# Patient Record
Sex: FEMALE | Race: WHITE | Employment: UNEMPLOYED | URBAN - METROPOLITAN AREA
[De-identification: names, ages, dates, MRNs, and addresses within clinical notes are randomized per-mention and may not be internally consistent; named-entity substitution may affect disease eponyms.]

---

## 2019-08-12 ENCOUNTER — HOSPITAL ENCOUNTER (EMERGENCY)
Facility: HOSPITAL | Age: 54
Discharge: HOME | End: 2019-08-13
Attending: EMERGENCY MEDICINE
Payer: MEDICAID

## 2019-08-12 DIAGNOSIS — J45.901 ASTHMA EXACERBATION, MILD: Primary | ICD-10-CM

## 2019-08-12 DIAGNOSIS — K21.9 GASTROESOPHAGEAL REFLUX DISEASE, ESOPHAGITIS PRESENCE NOT SPECIFIED: ICD-10-CM

## 2019-08-12 PROCEDURE — 96375 TX/PRO/DX INJ NEW DRUG ADDON: CPT

## 2019-08-12 PROCEDURE — 96374 THER/PROPH/DIAG INJ IV PUSH: CPT

## 2019-08-12 PROCEDURE — 99284 EMERGENCY DEPT VISIT MOD MDM: CPT

## 2019-08-13 ENCOUNTER — APPOINTMENT (OUTPATIENT)
Dept: GENERAL RADIOLOGY | Facility: HOSPITAL | Age: 54
End: 2019-08-13
Attending: EMERGENCY MEDICINE
Payer: MEDICAID

## 2019-08-13 VITALS
WEIGHT: 216 LBS | HEART RATE: 67 BPM | RESPIRATION RATE: 18 BRPM | TEMPERATURE: 98 F | SYSTOLIC BLOOD PRESSURE: 130 MMHG | DIASTOLIC BLOOD PRESSURE: 56 MMHG | HEIGHT: 64 IN | BODY MASS INDEX: 36.88 KG/M2 | OXYGEN SATURATION: 93 %

## 2019-08-13 LAB
ANION GAP SERPL CALC-SCNC: 7 MMOL/L (ref 0–18)
BASOPHILS # BLD AUTO: 0.05 X10(3) UL (ref 0–0.2)
BASOPHILS NFR BLD AUTO: 0.5 %
BUN BLD-MCNC: 10 MG/DL (ref 7–18)
BUN/CREAT SERPL: 13.3 (ref 10–20)
CALCIUM BLD-MCNC: 10.6 MG/DL (ref 8.5–10.1)
CHLORIDE SERPL-SCNC: 104 MMOL/L (ref 98–112)
CO2 SERPL-SCNC: 28 MMOL/L (ref 21–32)
CREAT BLD-MCNC: 0.75 MG/DL (ref 0.55–1.02)
DEPRECATED RDW RBC AUTO: 44.3 FL (ref 35.1–46.3)
EOSINOPHIL # BLD AUTO: 0.41 X10(3) UL (ref 0–0.7)
EOSINOPHIL NFR BLD AUTO: 4 %
ERYTHROCYTE [DISTWIDTH] IN BLOOD BY AUTOMATED COUNT: 13.1 % (ref 11–15)
GLUCOSE BLD-MCNC: 118 MG/DL (ref 70–99)
HCT VFR BLD AUTO: 46.8 % (ref 35–48)
HGB BLD-MCNC: 16.1 G/DL (ref 12–16)
IMM GRANULOCYTES # BLD AUTO: 0.03 X10(3) UL (ref 0–1)
IMM GRANULOCYTES NFR BLD: 0.3 %
LYMPHOCYTES # BLD AUTO: 2.27 X10(3) UL (ref 1–4)
LYMPHOCYTES NFR BLD AUTO: 22.1 %
MCH RBC QN AUTO: 31.6 PG (ref 26–34)
MCHC RBC AUTO-ENTMCNC: 34.4 G/DL (ref 31–37)
MCV RBC AUTO: 91.9 FL (ref 80–100)
MONOCYTES # BLD AUTO: 0.52 X10(3) UL (ref 0.1–1)
MONOCYTES NFR BLD AUTO: 5.1 %
NEUTROPHILS # BLD AUTO: 7.01 X10 (3) UL (ref 1.5–7.7)
NEUTROPHILS # BLD AUTO: 7.01 X10(3) UL (ref 1.5–7.7)
NEUTROPHILS NFR BLD AUTO: 68 %
NT-PROBNP SERPL-MCNC: 10 PG/ML (ref ?–125)
OSMOLALITY SERPL CALC.SUM OF ELEC: 288 MOSM/KG (ref 275–295)
PLATELET # BLD AUTO: 267 10(3)UL (ref 150–450)
RBC # BLD AUTO: 5.09 X10(6)UL (ref 3.8–5.3)
SODIUM SERPL-SCNC: 139 MMOL/L (ref 136–145)
TROPONIN I SERPL-MCNC: <0.045 NG/ML (ref ?–0.04)
WBC # BLD AUTO: 10.3 X10(3) UL (ref 4–11)

## 2019-08-13 PROCEDURE — 83880 ASSAY OF NATRIURETIC PEPTIDE: CPT | Performed by: EMERGENCY MEDICINE

## 2019-08-13 PROCEDURE — 94640 AIRWAY INHALATION TREATMENT: CPT

## 2019-08-13 PROCEDURE — 85025 COMPLETE CBC W/AUTO DIFF WBC: CPT | Performed by: EMERGENCY MEDICINE

## 2019-08-13 PROCEDURE — C9113 INJ PANTOPRAZOLE SODIUM, VIA: HCPCS | Performed by: EMERGENCY MEDICINE

## 2019-08-13 PROCEDURE — 93005 ELECTROCARDIOGRAM TRACING: CPT

## 2019-08-13 PROCEDURE — 93010 ELECTROCARDIOGRAM REPORT: CPT | Performed by: EMERGENCY MEDICINE

## 2019-08-13 PROCEDURE — 84484 ASSAY OF TROPONIN QUANT: CPT | Performed by: EMERGENCY MEDICINE

## 2019-08-13 PROCEDURE — 71046 X-RAY EXAM CHEST 2 VIEWS: CPT | Performed by: EMERGENCY MEDICINE

## 2019-08-13 PROCEDURE — 80048 BASIC METABOLIC PNL TOTAL CA: CPT | Performed by: EMERGENCY MEDICINE

## 2019-08-13 RX ORDER — IPRATROPIUM BROMIDE AND ALBUTEROL SULFATE 2.5; .5 MG/3ML; MG/3ML
3 SOLUTION RESPIRATORY (INHALATION) ONCE
Status: COMPLETED | OUTPATIENT
Start: 2019-08-13 | End: 2019-08-13

## 2019-08-13 RX ORDER — METHYLPREDNISOLONE SODIUM SUCCINATE 125 MG/2ML
125 INJECTION, POWDER, LYOPHILIZED, FOR SOLUTION INTRAMUSCULAR; INTRAVENOUS ONCE
Status: COMPLETED | OUTPATIENT
Start: 2019-08-13 | End: 2019-08-13

## 2019-08-13 RX ORDER — PANTOPRAZOLE SODIUM 40 MG/1
40 TABLET, DELAYED RELEASE ORAL DAILY
Qty: 14 TABLET | Refills: 0 | Status: SHIPPED | OUTPATIENT
Start: 2019-08-13 | End: 2019-08-27

## 2019-08-13 RX ORDER — METOCLOPRAMIDE HYDROCHLORIDE 5 MG/ML
5 INJECTION INTRAMUSCULAR; INTRAVENOUS ONCE
Status: COMPLETED | OUTPATIENT
Start: 2019-08-13 | End: 2019-08-13

## 2019-08-13 RX ORDER — METHYLPREDNISOLONE 4 MG/1
TABLET ORAL
Qty: 1 PACKAGE | Refills: 0 | Status: SHIPPED | OUTPATIENT
Start: 2019-08-13

## 2019-08-13 NOTE — ED PROVIDER NOTES
Patient Seen in: [facility] AND 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 There is no tenderness. There is no guarding. Musculoskeletal: Normal range of motion. No edema or tenderness. Neurological: Alert and oriented to person, place, and time. Skin: Skin is warm and dry. Psychiatric: Normal mood and affect.   Beha electronically signed and verified by the Radiologist whose name is printed above. DD:  08/13/2019/DT:  08/13/2019         [facility]   Patient feeling improved here. Work-up unremarkable. Tolerating p.o. Instructed on close follow-up and return.   Extremely

## 2025-03-19 ENCOUNTER — APPOINTMENT (OUTPATIENT)
Dept: GENERAL RADIOLOGY | Facility: HOSPITAL | Age: 60
End: 2025-03-19
Attending: STUDENT IN AN ORGANIZED HEALTH CARE EDUCATION/TRAINING PROGRAM
Payer: MEDICAID

## 2025-03-19 ENCOUNTER — HOSPITAL ENCOUNTER (EMERGENCY)
Facility: HOSPITAL | Age: 60
Discharge: HOME | End: 2025-03-19
Attending: STUDENT IN AN ORGANIZED HEALTH CARE EDUCATION/TRAINING PROGRAM
Payer: MEDICAID

## 2025-03-19 VITALS
HEART RATE: 60 BPM | BODY MASS INDEX: 28.67 KG/M2 | RESPIRATION RATE: 18 BRPM | OXYGEN SATURATION: 98 % | TEMPERATURE: 98 F | WEIGHT: 170 LBS | HEIGHT: 64.5 IN | SYSTOLIC BLOOD PRESSURE: 149 MMHG | DIASTOLIC BLOOD PRESSURE: 56 MMHG

## 2025-03-19 DIAGNOSIS — M79.671 RIGHT FOOT PAIN: Primary | ICD-10-CM

## 2025-03-19 DIAGNOSIS — I10 HYPERTENSION, UNSPECIFIED TYPE: ICD-10-CM

## 2025-03-19 LAB
ANION GAP SERPL CALC-SCNC: 6 MMOL/L (ref 0–18)
BASOPHILS # BLD AUTO: 0.04 X10(3) UL (ref 0–0.2)
BASOPHILS NFR BLD AUTO: 0.5 %
BUN BLD-MCNC: <5 MG/DL (ref 9–23)
CALCIUM BLD-MCNC: 9.3 MG/DL (ref 8.7–10.4)
CHLORIDE SERPL-SCNC: 106 MMOL/L (ref 98–112)
CO2 SERPL-SCNC: 27 MMOL/L (ref 21–32)
CREAT BLD-MCNC: 0.64 MG/DL
DEPRECATED RDW RBC AUTO: 40.5 FL (ref 35.1–46.3)
EGFRCR SERPLBLD CKD-EPI 2021: 100 ML/MIN/1.73M2 (ref 60–?)
EOSINOPHIL # BLD AUTO: 0.34 X10(3) UL (ref 0–0.7)
EOSINOPHIL NFR BLD AUTO: 4.4 %
ERYTHROCYTE [DISTWIDTH] IN BLOOD BY AUTOMATED COUNT: 12.2 % (ref 11–15)
GLUCOSE BLD-MCNC: 78 MG/DL (ref 70–99)
HCT VFR BLD AUTO: 42.2 %
HGB BLD-MCNC: 14.6 G/DL
IMM GRANULOCYTES # BLD AUTO: 0.02 X10(3) UL (ref 0–1)
IMM GRANULOCYTES NFR BLD: 0.3 %
LYMPHOCYTES # BLD AUTO: 1.98 X10(3) UL (ref 1–4)
LYMPHOCYTES NFR BLD AUTO: 25.6 %
MCH RBC QN AUTO: 31.4 PG (ref 26–34)
MCHC RBC AUTO-ENTMCNC: 34.6 G/DL (ref 31–37)
MCV RBC AUTO: 90.8 FL
MONOCYTES # BLD AUTO: 0.45 X10(3) UL (ref 0.1–1)
MONOCYTES NFR BLD AUTO: 5.8 %
NEUTROPHILS # BLD AUTO: 4.9 X10 (3) UL (ref 1.5–7.7)
NEUTROPHILS # BLD AUTO: 4.9 X10(3) UL (ref 1.5–7.7)
NEUTROPHILS NFR BLD AUTO: 63.4 %
PLATELET # BLD AUTO: 228 10(3)UL (ref 150–450)
POTASSIUM SERPL-SCNC: 4.2 MMOL/L (ref 3.5–5.1)
RBC # BLD AUTO: 4.65 X10(6)UL
SODIUM SERPL-SCNC: 139 MMOL/L (ref 136–145)
WBC # BLD AUTO: 7.7 X10(3) UL (ref 4–11)

## 2025-03-19 PROCEDURE — 85025 COMPLETE CBC W/AUTO DIFF WBC: CPT

## 2025-03-19 PROCEDURE — 80048 BASIC METABOLIC PNL TOTAL CA: CPT | Performed by: STUDENT IN AN ORGANIZED HEALTH CARE EDUCATION/TRAINING PROGRAM

## 2025-03-19 PROCEDURE — 80048 BASIC METABOLIC PNL TOTAL CA: CPT

## 2025-03-19 PROCEDURE — 99284 EMERGENCY DEPT VISIT MOD MDM: CPT

## 2025-03-19 PROCEDURE — 85025 COMPLETE CBC W/AUTO DIFF WBC: CPT | Performed by: STUDENT IN AN ORGANIZED HEALTH CARE EDUCATION/TRAINING PROGRAM

## 2025-03-19 PROCEDURE — 36415 COLL VENOUS BLD VENIPUNCTURE: CPT

## 2025-03-19 PROCEDURE — 73630 X-RAY EXAM OF FOOT: CPT | Performed by: STUDENT IN AN ORGANIZED HEALTH CARE EDUCATION/TRAINING PROGRAM

## 2025-03-19 RX ORDER — HYDROCODONE BITARTRATE AND ACETAMINOPHEN 5; 325 MG/1; MG/1
1 TABLET ORAL
COMMUNITY
Start: 2025-01-23

## 2025-03-19 RX ORDER — AMLODIPINE BESYLATE 5 MG/1
5 TABLET ORAL DAILY
Qty: 30 TABLET | Refills: 0 | Status: SHIPPED | OUTPATIENT
Start: 2025-03-19

## 2025-03-19 RX ORDER — LANSOPRAZOLE 30 MG/1
1 TABLET, ORALLY DISINTEGRATING, DELAYED RELEASE ORAL
COMMUNITY
Start: 2023-09-18

## 2025-03-19 RX ORDER — BUTALBITAL, ACETAMINOPHEN AND CAFFEINE 50; 325; 40 MG/1; MG/1; MG/1
1 TABLET ORAL
COMMUNITY
Start: 2024-08-20

## 2025-03-19 RX ORDER — DIAZEPAM 10 MG/1
10 TABLET ORAL AS NEEDED
COMMUNITY

## 2025-03-19 NOTE — DISCHARGE INSTRUCTIONS
Take Tylenol as needed for pain in your foot  Ice and elevate the foot  Take the medication as prescribed for elevated blood pressure, check your blood pressure daily and keep a log of this to provide your primary care doctor  Return to the ER for any new or worsening symptoms

## 2025-03-19 NOTE — ED INITIAL ASSESSMENT (HPI)
Pt presents to the ER with c/o intermittent atraumatic right foot swelling x 6 months.     Pt states she no longer has a PCP. Also is not prescribed B/P medications 198/71 in triage

## 2025-03-19 NOTE — ED PROVIDER NOTES
Patient Seen in: [facility] Emergency Department      History     Chief Complaint   Patient presents with    Swelling Edema     Stated Complaint: r foot swelling    Subjective:   HPI      59-year-old female with history of asthma seizure disorder, present for evaluation of right foot swelling.  She describes a few days atraumatic right foot pain and swelling.  Mostly on the dorsum of the right foot just distal to the ankle fold.  Cannot recall any trauma.  No fevers or chills.     Objective:     Past Medical History:    Asthma (HCC)    Seizure disorder (HCC)              History reviewed. No pertinent surgical history.             Social History     Socioeconomic History    Marital status:    Tobacco Use    Smoking status: Heavy Smoker     Current packs/day: 1.00     Types: Cigarettes    Smokeless tobacco: Never     Social Drivers of Health     Food Insecurity: No Food Insecurity (4/24/2024)    Received from [facility]    Hunger Vital Sign     Worried About Running Out of Food in the Last Year: Never true     Ran Out of Food in the Last Year: Never true   Transportation Needs: Unmet Transportation Needs (4/24/2024)    Received from [facility]    PRAPARE - Transportation     Lack of Transportation (Medical): Yes     Lack of Transportation (Non-Medical): Yes   Housing Stability: Low Risk  (4/24/2024)    Received from [facility]    Housing Stability Vital Sign     Unable to Pay for Housing in the Last Year: No     Number of Places Lived in the Last Year: 1     Unstable Housing in the Last Year: No                  Physical Exam     ED Triage Vitals [03/19/25 1030]   BP (!) 198/71   Pulse 83   Resp 18   Temp 97.8 °F (36.6 °C)   Temp src Oral   SpO2 95 %   O2 Device None (Room air)       Current Vitals:   Vital Signs  BP: 149/56  Pulse: 60  Resp: 18  Temp: 97.8 °F (36.6 °C)  Temp src: Oral    Oxygen Therapy  SpO2: 98 %  O2 Device: None  (Room air)        Physical Exam  Constitutional: awake, alert, no sig distress  HENT: mmm, no lesions,  Neck: normal range of motion, no tenderness, supple.  Eyes: PERRL, EOMI, conjunctiva normal, no discharge. Sclera anicteric.  Cardiovascular: rr no murmur  Respiratory: Normal breath sounds, no respiratory distress, no wheezing, no chest tenderness.  GI: Bowel sounds normal, Soft, no tenderness, no masses, no pulsatile masses.  : No CVA tenderness.  Skin: Warm, dry, no erythema, no rash.  Musculoskeletal: Intact distal pulses, trace pedal edema bilaterally, with attention to right foot the point of maximal tenderness is in the midline of the dorsum of the right foot just distal to ankle fold.  There is no associated skin changes.  There is no crepitus or deformity.  There is no pain about the ankle.  Achilles is intact.  Sensation to light touch.  Strength appears limited secondary to pain.  Neurologic: Alert & oriented x 3, normal motor function, normal sensory function, no focal deficits noted.  Psych: Calm, cooperative, nl affect        ED Course     Labs Reviewed   BASIC METABOLIC PANEL (8) - Abnormal; Notable for the following components:       Result Value    BUN <5 (*)     All other components within normal limits   CBC WITH DIFFERENTIAL WITH PLATELET   RAINBOW DRAW LAVENDER   RAINBOW DRAW LIGHT GREEN   RAINBOW DRAW BLUE       ED Course as of 03/19/25 1413  ------------------------------------------------------------  Time: 03/19 1328  Comment: Reviewed x-ray of right foot independently which is remarkable for degenerative changes but no acute fracture.              MDM      59F hx as above who presents with atraumatic right foot pain/swelling  On arrival - markedly hypertensive - likely 2/2 pain and HTN  Ddx: occult fracture, OA, muscular strain  -consider screening labs to eval renal function in light of marked HTN on presentation      Labs reviewed no acute findings.  Remains hypertensive in the ER  will start amlodipine.  Return precautions and follow-up instructions were discussed with patient who voiced understanding and agreement the plan.  All questions were answered to patient satisfaction.  Medical Decision Making      Disposition and Plan     Clinical Impression:  1. Right foot pain    2. Hypertension, unspecified type         Disposition:  Discharge  3/19/2025  1:30 pm    Follow-up:  [facility] Emergency Department  [address]  [facility] [address]  [phone]  Follow up  As needed, If symptoms worsen    Mateo Howard MD  [address] [place] [id number]  [phone]    Call            Medications Prescribed:  Discharge Medication List as of 3/19/2025  1:43 PM        START taking these medications    Details   amLODIPine 5 MG Oral Tab Take 1 tablet (5 mg total) by mouth daily., Normal, Disp-30 tablet, R-0                 Supplementary Documentation:

## 2025-04-02 ENCOUNTER — OFFICE VISIT (OUTPATIENT)
Dept: INTERNAL MEDICINE CLINIC | Facility: CLINIC | Age: 60
End: 2025-04-02

## 2025-04-02 VITALS
SYSTOLIC BLOOD PRESSURE: 134 MMHG | DIASTOLIC BLOOD PRESSURE: 73 MMHG | BODY MASS INDEX: 29.53 KG/M2 | HEART RATE: 78 BPM | WEIGHT: 173 LBS | HEIGHT: 64 IN

## 2025-04-02 DIAGNOSIS — I10 ESSENTIAL HYPERTENSION: Chronic | ICD-10-CM

## 2025-04-02 DIAGNOSIS — N83.202 CYSTS OF BOTH OVARIES: ICD-10-CM

## 2025-04-02 DIAGNOSIS — K80.10 CALCULUS OF GALLBLADDER WITH CHRONIC CHOLECYSTITIS WITHOUT OBSTRUCTION: ICD-10-CM

## 2025-04-02 DIAGNOSIS — R59.9 LYMPH NODES ENLARGED: ICD-10-CM

## 2025-04-02 DIAGNOSIS — D13.0 SQUAMOUS CELL PAPILLOMA OF ESOPHAGUS: ICD-10-CM

## 2025-04-02 DIAGNOSIS — M25.50 POLYARTHRALGIA: ICD-10-CM

## 2025-04-02 DIAGNOSIS — N39.46 MIXED STRESS AND URGE URINARY INCONTINENCE: Chronic | ICD-10-CM

## 2025-04-02 DIAGNOSIS — Z12.11 ENCOUNTER FOR COLORECTAL CANCER SCREENING: Primary | ICD-10-CM

## 2025-04-02 DIAGNOSIS — R91.1 PULMONARY NODULE: Chronic | ICD-10-CM

## 2025-04-02 DIAGNOSIS — K21.9 GASTROESOPHAGEAL REFLUX DISEASE WITHOUT ESOPHAGITIS: ICD-10-CM

## 2025-04-02 DIAGNOSIS — M50.90 CERVICAL DISC DISORDER: ICD-10-CM

## 2025-04-02 DIAGNOSIS — Z87.19 HISTORY OF PANCREATITIS: ICD-10-CM

## 2025-04-02 DIAGNOSIS — Z12.12 ENCOUNTER FOR COLORECTAL CANCER SCREENING: Primary | ICD-10-CM

## 2025-04-02 DIAGNOSIS — N83.201 CYSTS OF BOTH OVARIES: ICD-10-CM

## 2025-04-02 PROCEDURE — 99205 OFFICE O/P NEW HI 60 MIN: CPT | Performed by: STUDENT IN AN ORGANIZED HEALTH CARE EDUCATION/TRAINING PROGRAM

## 2025-04-02 RX ORDER — MECOBALAMIN 5000 MCG
15 TABLET,DISINTEGRATING ORAL DAILY
COMMUNITY
End: 2025-04-02

## 2025-04-02 RX ORDER — DIPHENHYDRAMINE HCL 25 MG
25 CAPSULE ORAL EVERY 6 HOURS PRN
COMMUNITY

## 2025-04-02 RX ORDER — DIAZEPAM 5 MG/1
5 TABLET ORAL NIGHTLY PRN
COMMUNITY
Start: 2025-01-18 | End: 2025-04-02

## 2025-04-02 RX ORDER — AMLODIPINE BESYLATE 10 MG/1
10 TABLET ORAL DAILY
Qty: 90 TABLET | Refills: 1 | Status: SHIPPED | OUTPATIENT
Start: 2025-04-02

## 2025-04-02 RX ORDER — MECOBALAMIN 5000 MCG
15 TABLET,DISINTEGRATING ORAL DAILY
Qty: 90 CAPSULE | Refills: 3 | Status: SHIPPED | OUTPATIENT
Start: 2025-04-02

## 2025-04-02 NOTE — PROGRESS NOTES
OFFICE NOTE       The following individual(s) verbally consented to be recorded using ambient AI listening technology and understand that they can each withdraw their consent to this listening technology at any point by asking the clinician to turn off or pause the recording:    Patient name: Ariana Dumont  Additional names:            Patient ID: Ariana Dumont is a 59 year old female.  Today's Date: 04/02/25  Chief Complaint: Establish Care and ER F/U    History of Present Illness  Ariana Dumont is a 59 year old female who presents to establish [facility] after a recent ER visit for hypertension and acute pancreatitis.    She was hospitalized on January 21st and 22nd at [facility] for acute pancreatitis. She also visited the ER on March 19th for right foot swelling. She has a history of acute pancreatitis and is due for colorectal cancer screening.    She has a history of gallbladder issues, with a previous recommendation for removal, which she has not pursued. She experiences pain in the right side below the ovaries. She has a history of cervical degenerative disc disease, neck pain, hip and leg pain, and osteoarthritis in her hands and feet.    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 She experiences pain and mucus in her urine occasionally, with reddish urine at times. She also reports a history of migraine disorders.    She has allergies to sulfa and contrast dye, which previously caused her heart to stop. She reports a history of a right pulmonary lobe nodule and a history of fibromyalgia. She experiences leg swelling, which she attributes to either bladder issues or arthritis, and has difficulty wearing shoes due to swelling.    She reports a palpable lymph node on the right side of her neck that causes pain radiating to her ear and sinuses, particularly at night. She also mentions a history  of large lymph nodes in her lower abdomen.    She is on social security and disability, caring for a partner with lung cancer undergoing radiation, and has two large dogs at home. She reports urinary incontinence and is seeking assistance from public aid.    Her current medications include amlodipine 5 mg for blood pressure, lansoprazole for reflux, a women's one-a-day B complex with vitamin C, Benadryl as needed for allergies, vitamin D 1000 units daily, oil of oregano for sinus issues, and an off-brand Tylenol for arthritis pain. She reports that lansoprazole 30 mg is too strong, so she takes 15 mg in the morning.       Vitals:    04/02/25 1314 04/02/25 1332   BP: (!) 161/70 134/73   Pulse: 80 78   Weight: 173 lb (78.5 kg)    Height: 5' 4\" (1.626 m)      body mass index is 29.7 kg/m².  BP Readings from Last 3 Encounters:   04/02/25 134/73   03/19/25 149/56   08/13/19 130/56     The ASCVD Risk score (Analilia DK, et al., 2019) failed to calculate for the following reasons:    Cannot find a previous HDL lab    Cannot find a previous total cholesterol lab  Results  RADIOLOGY  MRCP: No definite stone, edematous pancreatic head and body, no splenomegaly, prominent retroperitoneal pancreatic lymph nodes (01/21/2025)  Pelvic ultrasound: No significant findings (11/13/2023)  Chest CT: 6 mm right lower lobe pulmonary nodule (06/26/2024)    DIAGNOSTIC  Colonoscopy: Signs of reflux, squamous cell papillary esophagus (05/30/2023)       Medications reviewed:  Current Outpatient Medications   Medication Sig Dispense Refill    Multiple Vitamins-Minerals (ONE-A-DAY WOMENS 50 PLUS OR) Take by mouth.      B Complex-C-Folic Acid Oral Tab Take by mouth.      diphenhydrAMINE 25 MG Oral Cap Take 1 capsule (25 mg total) by mouth every 6 (six) hours as needed for Itching.      Lansoprazole 15 MG Oral Capsule Delayed Release Take 1 capsule (15 mg total) by mouth daily. 90 capsule 3    amLODIPine 10 MG Oral Tab Take 1 tablet (10 mg total) by  mouth daily. 90 tablet 1         Assessment & Plan    1. Encounter for colorectal cancer screening (Primary)  -     Cancel: Gastro Referral - In Network  -     Gastro Referral - In Network  2. History of pancreatitis  -     Cancel: Gastro Referral - In Network  -     Gastro Referral - In Network  3. Gastroesophageal reflux disease without esophagitis  -     Lansoprazole; Take 1 capsule (15 mg total) by mouth daily.  Dispense: 90 capsule; Refill: 3  -     Gastro Referral - In Network  4. Calculus of gallbladder with chronic cholecystitis without obstruction  -     Gastro Referral - In Network  5. Essential hypertension  -     Comp Metabolic Panel (14); Future; Expected date: 04/02/2025  -     amLODIPine Besylate; Take 1 tablet (10 mg total) by mouth daily.  Dispense: 90 tablet; Refill: 1  6. Squamous cell papilloma of esophagus  -     Gastro Referral - In Network  7. Cysts of both ovaries  -     OBG Referral - [place] ([place])  8. Cervical disc disorder  -     Physiatry Referral - In Network  -     CBC With Differential With Platelet; Future; Expected date: 04/02/2025  -     Comp Metabolic Panel (14); Future; Expected date: 04/02/2025  9. Polyarthralgia  -     Physiatry Referral - In Network  -     Rheumatoid Arthritis Factor; Future; Expected date: 04/02/2025  -     C-Reactive Protein; Future; Expected date: 04/02/2025  -     Sed RateRainer (Automated); Future; Expected date: 04/02/2025  -     Connective Tissue Disease (ZAIN) Screen, Reflex Specific Antibody; Future; Expected date: 04/02/2025  -     CBC With Differential With Platelet; Future; Expected date: 04/02/2025  -     Comp Metabolic Panel (14); Future; Expected date: 04/02/2025  -     Rheumatology Referral  10. Lymph nodes enlarged  -     CT SOFT TISSUE OF NECK (CPT=70490); Future; Expected date: 04/02/2025  -     CT CHEST+ABDOMEN+PELVIS(CPT=71250/20523); Future; Expected date: 04/02/2025  11. Mixed stress and urge urinary incontinence  12. Pulmonary  nodule  -     Pulmonary Referral - In Network    Assessment & Plan  Hypertension  Persistently elevated systolic blood pressure despite initial treatment with amlodipine.  - Increase amlodipine to 10 mg daily.  - Monitor blood pressure regularly.    Acute Pancreatitis  Asymptomatic post-hospitalization for gallstone pancreatitis. No definite gallstones on MRCP.  - Refer to gastroenterologist for further evaluation and management.    Gastroesophageal Reflux Disease (GERD)  Chronic GERD managed with lansoprazole. Prefers lower dose due to side effects.  - Continue lansoprazole 15 mg as tolerated.  - Refer to gastroenterologist for further evaluation.    Gallbladder Disease  Gallbladder issues with hardening. Hesitant about surgery.  - Refer to gastroenterologist for evaluation of gallbladder and potential endoscopy.    Osteoarthritis  Pain and swelling in hands and feet affecting mobility.  - Refer to physiatrist for evaluation and management.  - Order x-rays of the spine.    Cervical Degenerative Disc Disease  Neck pain related to degenerative disc disease.  - Refer to physiatrist for evaluation and management.  - Order x-rays of the spine.    Lymphadenopathy  Palpable lymph node in neck with pain radiating to ear and sinuses. Enlarged abdominal lymph nodes on MRCP.  - Order CT of the chest, abdomen, and pelvis without contrast.    Pulmonary Nodule  6 mm right lower lobe pulmonary nodule requires monitoring.  - Refer to pulmonologist for routine monitoring.    Chronic Kidney Disease  Scarred kidney from past infections. Function well-managed.  - Monitor kidney function as part of routine care.    Rheumatoid Arthritis (suspected)  Multiple joint issues and leg swelling suggest possible autoimmune condition.  - Order preliminary blood work to assess for inflammatory or autoimmune disease.  - Refer to rheumatologist for further evaluation.    Follow-up  Follow-up necessary for chronic disease management and test result  review.  - Schedule follow-up appointment in one month for chronic disease management and physical examination.       Follow Up: As needed/if symptoms worsen or No follow-ups on file..     I spent 61 minutes obtaining pertitent medical history, reviewing pertinent imaging/labs and specialists notes, evaluating patient, discussing differential diagnosis' and various treatment options, reinforcing importance of compliance with treatment plan, and completing documentation.     Encounter Times  PreChartin minutes    Reviewing/Obtainin minutes      Medical Exam: 5 minutes    Plan: 7 minutes      Notes: 2 minutes    Counseling/Education: 7 minutes      Referring/Communicating:   minutes    Ind Interpretation:   minutes      Care Coordination:   minutes       My total time spent caring for the patient on the day of the encounter: 61 minutes.       Objective/ Results:   Physical Exam  Constitutional:       Appearance: She is well-developed.   Cardiovascular:      Rate and Rhythm: Normal rate and regular rhythm.      Heart sounds: Normal heart sounds.   Pulmonary:      Effort: Pulmonary effort is normal.      Breath sounds: Normal breath sounds.   Abdominal:      General: Bowel sounds are normal.      Palpations: Abdomen is soft.   Musculoskeletal:         General: Tenderness present.   Skin:     General: Skin is warm and dry.   Neurological:      Mental Status: She is alert and oriented to person, place, and time.      Deep Tendon Reflexes: Reflexes are normal and symmetric.        Physical Exam  VITALS: BP- 134/73  NECK: Palpable 2 cm lymph node on the right side of the neck.     Reviewed:    Patient Active Problem List    Diagnosis    Squamous cell papilloma of esophagus    Mixed stress and urge urinary incontinence    Essential hypertension    Pulmonary nodule      Allergies[1]     Social History     Socioeconomic History    Marital status:    Tobacco Use    Smoking status: Heavy Smoker     Current  packs/day: 2.00     Types: Cigarettes     Passive exposure: Current    Smokeless tobacco: Never    Tobacco comments:     2 packs a day   Vaping Use    Vaping status: Never Used   Substance and Sexual Activity    Alcohol use: Never    Drug use: Never     Social Drivers of Health     Food Insecurity: No Food Insecurity (4/24/2024)    Received from [facility]    Hunger Vital Sign     Worried About Running Out of Food in the Last Year: Never true     Ran Out of Food in the Last Year: Never true   Transportation Needs: Unmet Transportation Needs (4/24/2024)    Received from [facility]    PRAPARE - Transportation     Lack of Transportation (Medical): Yes     Lack of Transportation (Non-Medical): Yes   Housing Stability: Low Risk  (4/24/2024)    Received from [facility]    Housing Stability Vital Sign     Unable to Pay for Housing in the Last Year: No     Number of Places Lived in the Last Year: 1     Unstable Housing in the Last Year: No      Review of Systems   Constitutional: Negative.    HENT: Negative.     Eyes: Negative.    Respiratory: Negative.     Cardiovascular: Negative.    Gastrointestinal: Negative.    Genitourinary: Negative.    Musculoskeletal:  Positive for arthralgias.   Skin: Negative.    Neurological: Negative.    Psychiatric/Behavioral: Negative.         All other systems negative unless otherwise stated in ROS or HPI above.       Mateo Howard MD  Internal Medicine       Call office with any questions or seek emergency care if necessary.   Patient understands and agrees to follow directions and advice.      ----------------------------------------- PATIENT INSTRUCTIONS-----------------------------------------     There are no Patient Instructions on file for this visit.        The 21st Century Cures Act makes medical notes available to patients in the interest of transparency.  However, please be advised that this is a medical document.   It is intended as a peer to peer communication.  It is written in medical language and may contain abbreviations or verbiage that are technical and unfamiliar.  It may appear blunt or direct.  Medical documents are intended to carry relevant information, facts as evident, and the clinical opinion of the practitioner.          [1]   Allergies  Allergen Reactions    Iodine (Topical) ANAPHYLAXIS    Radiology Contrast Iodinated Dyes SWELLING    Sulfa Antibiotics UNKNOWN

## 2025-04-03 ENCOUNTER — TELEPHONE (OUTPATIENT)
Dept: INTERNAL MEDICINE CLINIC | Facility: CLINIC | Age: 60
End: 2025-04-03

## 2025-04-04 ENCOUNTER — LAB ENCOUNTER (OUTPATIENT)
Dept: LAB | Facility: HOSPITAL | Age: 60
End: 2025-04-04
Attending: STUDENT IN AN ORGANIZED HEALTH CARE EDUCATION/TRAINING PROGRAM
Payer: MEDICAID

## 2025-04-04 ENCOUNTER — OFFICE VISIT (OUTPATIENT)
Dept: PULMONOLOGY | Facility: CLINIC | Age: 60
End: 2025-04-04
Payer: MEDICAID

## 2025-04-04 VITALS
BODY MASS INDEX: 29.53 KG/M2 | WEIGHT: 173 LBS | HEART RATE: 71 BPM | OXYGEN SATURATION: 96 % | DIASTOLIC BLOOD PRESSURE: 67 MMHG | HEIGHT: 64 IN | SYSTOLIC BLOOD PRESSURE: 150 MMHG

## 2025-04-04 DIAGNOSIS — R06.09 DYSPNEA ON EXERTION: Primary | ICD-10-CM

## 2025-04-04 DIAGNOSIS — Z87.09 HISTORY OF ASTHMA: ICD-10-CM

## 2025-04-04 DIAGNOSIS — R91.1 PULMONARY NODULE: ICD-10-CM

## 2025-04-04 DIAGNOSIS — I10 ESSENTIAL HYPERTENSION: Chronic | ICD-10-CM

## 2025-04-04 DIAGNOSIS — R06.09 DYSPNEA ON EXERTION: ICD-10-CM

## 2025-04-04 DIAGNOSIS — M25.50 POLYARTHRALGIA: ICD-10-CM

## 2025-04-04 DIAGNOSIS — F17.200 TOBACCO USE DISORDER: ICD-10-CM

## 2025-04-04 DIAGNOSIS — M50.90 CERVICAL DISC DISORDER: ICD-10-CM

## 2025-04-04 LAB
ALBUMIN SERPL-MCNC: 4.8 G/DL (ref 3.2–4.8)
ALBUMIN/GLOB SERPL: 2.1 {RATIO} (ref 1–2)
ALP LIVER SERPL-CCNC: 99 U/L
ALT SERPL-CCNC: 19 U/L
ANION GAP SERPL CALC-SCNC: 7 MMOL/L (ref 0–18)
AST SERPL-CCNC: 19 U/L (ref ?–34)
BASOPHILS # BLD AUTO: 0.05 X10(3) UL (ref 0–0.2)
BASOPHILS NFR BLD AUTO: 0.5 %
BILIRUB SERPL-MCNC: 0.4 MG/DL (ref 0.2–1.1)
BUN BLD-MCNC: <5 MG/DL (ref 9–23)
CALCIUM BLD-MCNC: 9.5 MG/DL (ref 8.7–10.4)
CHLORIDE SERPL-SCNC: 108 MMOL/L (ref 98–112)
CO2 SERPL-SCNC: 26 MMOL/L (ref 21–32)
CREAT BLD-MCNC: 0.65 MG/DL
CRP SERPL-MCNC: <0.4 MG/DL (ref ?–1)
DEPRECATED RDW RBC AUTO: 40.6 FL (ref 35.1–46.3)
EGFRCR SERPLBLD CKD-EPI 2021: 100 ML/MIN/1.73M2 (ref 60–?)
EOSINOPHIL # BLD AUTO: 0.36 X10(3) UL (ref 0–0.7)
EOSINOPHIL NFR BLD AUTO: 3.6 %
ERYTHROCYTE [DISTWIDTH] IN BLOOD BY AUTOMATED COUNT: 12 % (ref 11–15)
ERYTHROCYTE [SEDIMENTATION RATE] IN BLOOD: 26 MM/HR
FASTING STATUS PATIENT QL REPORTED: YES
GLOBULIN PLAS-MCNC: 2.3 G/DL (ref 2–3.5)
GLUCOSE BLD-MCNC: 95 MG/DL (ref 70–99)
HCT VFR BLD AUTO: 43.8 %
HGB BLD-MCNC: 15.6 G/DL
IMM GRANULOCYTES # BLD AUTO: 0.04 X10(3) UL (ref 0–1)
IMM GRANULOCYTES NFR BLD: 0.4 %
LYMPHOCYTES # BLD AUTO: 2.13 X10(3) UL (ref 1–4)
LYMPHOCYTES NFR BLD AUTO: 21 %
MCH RBC QN AUTO: 32.5 PG (ref 26–34)
MCHC RBC AUTO-ENTMCNC: 35.6 G/DL (ref 31–37)
MCV RBC AUTO: 91.3 FL
MONOCYTES # BLD AUTO: 0.35 X10(3) UL (ref 0.1–1)
MONOCYTES NFR BLD AUTO: 3.5 %
NEUTROPHILS # BLD AUTO: 7.2 X10 (3) UL (ref 1.5–7.7)
NEUTROPHILS # BLD AUTO: 7.2 X10(3) UL (ref 1.5–7.7)
NEUTROPHILS NFR BLD AUTO: 71 %
NT-PROBNP SERPL-MCNC: <35 PG/ML (ref ?–125)
PLATELET # BLD AUTO: 229 10(3)UL (ref 150–450)
POTASSIUM SERPL-SCNC: 4.2 MMOL/L (ref 3.5–5.1)
PROT SERPL-MCNC: 7.1 G/DL (ref 5.7–8.2)
RBC # BLD AUTO: 4.8 X10(6)UL
RHEUMATOID FACT SERPL-ACNC: 8 IU/ML (ref ?–14)
SODIUM SERPL-SCNC: 141 MMOL/L (ref 136–145)
WBC # BLD AUTO: 10.1 X10(3) UL (ref 4–11)

## 2025-04-04 PROCEDURE — 36415 COLL VENOUS BLD VENIPUNCTURE: CPT

## 2025-04-04 PROCEDURE — 83880 ASSAY OF NATRIURETIC PEPTIDE: CPT

## 2025-04-04 PROCEDURE — 86038 ANTINUCLEAR ANTIBODIES: CPT

## 2025-04-04 PROCEDURE — 86140 C-REACTIVE PROTEIN: CPT

## 2025-04-04 PROCEDURE — 86225 DNA ANTIBODY NATIVE: CPT

## 2025-04-04 PROCEDURE — 85652 RBC SED RATE AUTOMATED: CPT

## 2025-04-04 PROCEDURE — 80053 COMPREHEN METABOLIC PANEL: CPT

## 2025-04-04 PROCEDURE — 86431 RHEUMATOID FACTOR QUANT: CPT

## 2025-04-04 PROCEDURE — 99205 OFFICE O/P NEW HI 60 MIN: CPT | Performed by: PHYSICIAN ASSISTANT

## 2025-04-04 PROCEDURE — 85025 COMPLETE CBC W/AUTO DIFF WBC: CPT

## 2025-04-04 RX ORDER — ALBUTEROL SULFATE 90 UG/1
2 INHALANT RESPIRATORY (INHALATION) EVERY 6 HOURS PRN
COMMUNITY

## 2025-04-04 NOTE — TELEPHONE ENCOUNTER
Noted, insurance did not cover will need to pay out of pocket, encourage to download [employer] for discount.

## 2025-04-04 NOTE — TELEPHONE ENCOUNTER
Lansoprazole denied due to taking longer than the duration limit per plan.   Per Dr Howard patient notified to pay out of pocket via [employer].

## 2025-04-04 NOTE — PATIENT INSTRUCTIONS
Recommend RSV vaccine which you can get at your pharmacy. Recommend annual influenza (flu) vaccine.    Call to schedule pulmonary function testing and CT scans - [phone].    Please let me know once you get the CT chest done so I can review.    Go get blood work done at the [facility].    If you decide you are ready to try medications for quitting smoking, please call our office and let me know. [phone].    Quitting smoking is one of the most important things you can do for your health. The sooner you quit smoking, the greater the benefits. It is never too late to quit smoking.    Strategies for quitting smoking:  Set a quit date. If you have difficulty with \"all-or-nothing\"/cold turkey quitting, you can gradually cut down in anticipation of quit date further in the future.  Tell family, friends, and co-workers about your plan to quit and request support. Request individuals who also smoke to not smoke around you.  Remove tobacco and vaping products from your environment.  Total abstinence is essential. Not even a single puff after the quit date is important.  Anticipate potential withdrawal symptoms so you can have a plan in place to address symptoms if needed. Symptoms can include: cravings, depressed mood, anxiety, irritability/anger, restlessness, sleep disturbances, difficulty concentrating, and increased appetite.    Resources:  [place] Quit Line  Call 1-866-QUIT-Yes  http://quityes.org/    [employer] [place] - many good resources  6-060-23P-QUIT  http://smokefree.gov/    SmokefreeTXT - get text reminders and tips and encouragment  http://smokefree.gov/smokefreetxt    Suman Jacob's Easy Way to Stop Smoking - book    Risks of smoking:  -Doubles a person's risk of developing coronary artery disease, a condition that can lead to heart attack. One year after stopping smoking, the risk of dying from coronary artery disease is reduced by approximately one-half and continues to decline over  time.  -Increases risk of stroke.  -Increases risk of lung disease including chronic obstructive pulmonary disease. Much of the lung damage that occurs from smoking is irreversible but quitting smoking can reduce further lung damage.  -Cigarette smoking is responsible for nearly 90% of cases of lung cancer.  -Increases risk of other types of cancer, including cancers of the head and neck, esophagus, pancreas, and bladder.  -Increases risk of osteoporosis.  -Increases risk of peptic ulcer disease.  -Contributes to erectile dysfunction.  -Secondhand smoke exposure increases risk of lung cancer, coronary artery disease, and stroke.

## 2025-04-04 NOTE — PROGRESS NOTES
Pulmonary Consult Note    History of Present Illness:  Ariana Dumont is a 59 year old female presenting to pulmonary clinic today for dyspnea and pulmonary nodule, referred by pcp Dr. Howard. She describes a long history of dyspnea on exertion for over 20 years which is slowly progressively worsening. She feels winded with 100 feet. No shortness of breath at rest. She has history of wheezing but none at present. She has a chronic productive cough with white or brown sputum. Sometimes sputum is blood-tinged. She has occasional chest tightness. She has leg swelling which started 1 year ago. She was recently started on amlodipine and the leg swelling is unchanged with this. She states she was diagnosed with asthma in her 20s, and she has been hospitalized for asthma about 5-6 times approximately 20 years ago. No recent steroid use or exacerbations. She has been on inhalers in the past but now is using only albuterol 1-2 times per week. Albuterol helps minimally. No prior PFTs. She had a CT for kidney stone in 6/2024 which demonstrated an incidental 6 mm right lower lobe pulmonary nodule. She is a current smoker. She smokes on average 1.5 pack/day but some days is up to 3 pack/day. She would like to cut back on smoking but is not ready to quit due to anxiety as the nicotine calms her nerves. She tried nicotine gum and patch in the past and did not like the feeling these gave her. She has not tried Chantix. She thinks she was on bupropion at one point but unclear if this was for smoking cessation or if it was beneficial. She has history of depression with suicidal attempt 30 years ago at which time she was also addicted to cocaine. There is seizure disorder listed in her medical history but patient does not recall this. I reviewed a vascular neurology note from 2019 which notes she had an abnormal EEG and was started on Keppra. She does not get any vaccinations.    Past Medical History: Hypertension, asthma,  degenerative disc disease, gastroesophageal reflux disease, osteoarthritis, allergic rhinitis    Past Surgical History: Tubal ligation, reconstructive bladder and kidney surgery    Family Medical History: Mother  with stroke, father  with lung cancer (smoker)    Social History: Single, has 2 kids, on disability, prior multiple odd jobs  -Tobacco: Current smoker, smokes 1.5 ppd for 45 years  -Alcohol: None  -Vaping: None  -Other illicit drugs: History of \"crack\" cocaine and marijuana (quit )  -Pets: 2 dogs, fish    Allergies: Iodine (topical), Radiology contrast iodinated dyes, and Sulfa antibiotics     Medications: has a current medication list which includes the following prescription(s): vitamin d3, albuterol, multiple vitamins-minerals, b complex-c-folic acid, diphenhydramine, lansoprazole, and amlodipine.    Review of Systems:   Constitutional: No fever or chills. No weight loss or weight gain.  HEENT: No vision changes. +Chronic nasal congestion.  Cardio: +Occasional chest pain, worse with stress.  Respiratory: See HPI.  GI: +Acid reflux.  Extremities: +Diffuse arthralgias. +Lower extremity swelling.  Neurologic: No headache.  Skin: No rash.  Psych: +Anxiety and depression.     Physical Exam:  /76   Pulse 71   Ht 5' 4\" (1.626 m)   Wt 173 lb (78.5 kg)   SpO2 96%   BMI 29.70 kg/m²    Constitutional: Ambulates with cane. No acute distress.  HEENT: Head NC/AT. PEERL. No tonsillar or uvula enlargement.   Cardio: Regular rate and rhythm. Normal S1 and S2. No murmurs.   Respiratory: Thorax symmetrical with no labored breathing. Clear to ausculation bilaterally with symmetrical breath sounds. No wheezing, rhonchi, or crackles.   Extremities: No clubbing or cyanosis. Bilateral LE edema. No calf tenderness.  Neurologic: A&Ox3. No gross motor deficits.  Skin: Warm, dry.  Lymphatic: No cervical or supraclavicular lymphadenopathy.  Psych: Calm, cooperative. Pleasant  affect.    Results:  Ambulatory oximetry today in office: Oxygen saturations maintained 96% and above with ambulatory oximetry.    CXR 1/20/2025: Normal cardiac silhouette size. Negative mediastinal widening. No focal airspace opacity. No pleural effusion. No pneumothorax.    CT renal stone 6/26/2024: Incidental note of a 6 mm right lower lobe pulmonary nodule.     Assessment/Plan:  Dyspnea on exertion and chronic cough  History of asthma not currently on maintenance inhaler therapy. Suspect COPD with extensive tobacco use. Extensive counseling was provided regarding the diagnosis, prognosis, and natural history of COPD. We also discussed the treatment of COPD and asthma, including the use of maintenance and reliever medications. She has leg swelling so will also check pro-BNP.  Plan:  -PFTs  -Anticipate initiation of maintenance inhaler after review of PFTs  -Albuterol MDI PRN  -Recommend RSV vaccine and annual influenza vaccine - patient declines  -Check pro-BNP    Pulmonary nodule  6 mm LLL nodule incidentally detected on CT renal stone 6/2024. Based on Fleischner Society Guidelines for Management of Incidentally Detected Pulmonary Nodules, 12 month follow up CT chest is recommended. If stable and no new nodules, can move to low dose lung screening program in 2026.  Plan:  -Agree with CT chest as ordered by pcp    Tobacco abuse  Extensive tobacco use of 1.5-3 packs/day. We discussed smoking cessation in detail including use of pharmacologics to assist. We discussed risks of ongoing tobacco use. She is very clear that she is not interested in quitting smoking at this time due to nerves/anxiety. I encouraged her to follow up with pcp to discuss anxiety. She is not interested in medications to help cut back. Varenicline would likely be best option if she is interested in the future as she did not previously tolerate nicotine replacement therapy and there is question of seizure history on Keppra at one point so  bupropion is contraindicated.  Plan:  -Smoking cessation (literature provided)  -Defer LDCT screening at this time as she will be getting CT chest for nodule follow up  -Follow up with pcp for management of anxiety    Nolberto Ayala PA-C  Pulmonary Medicine  4/4/2025

## 2025-04-04 NOTE — TELEPHONE ENCOUNTER
Denied    Note from payer: Details of this decision are provided on the physician outcome notice which has been faxed to the number on file.  Payer: [employer] [facility] Case ID: [id number]    [phone]  Electronic appeal: Not supported    Awaiting denial letter via fax.

## 2025-04-05 NOTE — PROGRESS NOTES
Please relay to patient if not read:     Favio Lane,   Your Inflammatory markers are negative and your Complete blood count as is your Complete metabolic panel (kidney/liver) function is stable. However please follow up with Rheumatologist for your symptoms might be seronegative rheumatoid arthritis  -Dr. Howard

## 2025-04-07 LAB
DSDNA IGG SERPL IA-ACNC: 0.7 IU/ML
ENA AB SER QL IA: 0.1 UG/L
ENA AB SER QL IA: NEGATIVE

## 2025-04-09 ENCOUNTER — NURSE TRIAGE (OUTPATIENT)
Dept: INTERNAL MEDICINE CLINIC | Facility: CLINIC | Age: 60
End: 2025-04-09

## 2025-04-09 NOTE — TELEPHONE ENCOUNTER
pain in my legs and feet.  (Newest Message First)             Ariana Whitman Rn Triage (supporting Mateo Howard MD) (Selected Message)  TS      4/9/25  2:00 PM  Last night I woke up in bad  pain, in my left foot, from my ankle and threw the foot. It felt like the top, had a mussle pulling. Right in the middle. I could hardly walk to the front room. Now both feet are hurting like that. The feet are starting to swell, and my legs are hurting and swelled. I have arthritis and take them, but only helps a bit. I'm taking care of my , who is getting radiation treatment for lung cancer. So I can't rest much. What should I try, to manage the pain?

## 2025-04-09 NOTE — TELEPHONE ENCOUNTER
Action Requested: Summary for Provider     []  Critical Lab, Recommendations Needed  [] Need Additional Advice  []   FYI    []   Need Orders  [] Need Medications Sent to Pharmacy  []  Other     SUMMARY: Per Protocol disposition advised to be seen in the office. There are no appts with PCP.  Assisted patient with appt scheduling, verbalized understanding and agrees to plan.   Future Appointments   Date Time Provider Department Center   4/10/2025 12:00 PM Lucina Shine APRN ECSCHIM [facility]   2025  8:45 AM Caryn Hills MD ECWMOOBNII [facility]   2025  1:30 PM Saundra Gonzalez APRN ECCFHGAFELECIA [facility]   10/8/2025  2:40 PM Shahzad Grover DO ECVICKY [facility]   Patient (name and  verified) calling with symptoms of bilateral swelling and pain for several weeks/months.     Reason for call: Acute  Onset: one month    Reason for Disposition  • MILD swelling of both ankles (i.e., pedal edema) AND new-onset or worsening    Protocols used: Leg Swelling and Edema-A-OH

## 2025-04-10 ENCOUNTER — OFFICE VISIT (OUTPATIENT)
Dept: INTERNAL MEDICINE CLINIC | Facility: CLINIC | Age: 60
End: 2025-04-10
Payer: MEDICAID

## 2025-04-10 VITALS
HEART RATE: 75 BPM | SYSTOLIC BLOOD PRESSURE: 178 MMHG | WEIGHT: 170 LBS | DIASTOLIC BLOOD PRESSURE: 70 MMHG | OXYGEN SATURATION: 96 % | BODY MASS INDEX: 29 KG/M2

## 2025-04-10 DIAGNOSIS — I10 ESSENTIAL HYPERTENSION: ICD-10-CM

## 2025-04-10 DIAGNOSIS — R07.9 CHEST PAIN OF UNCERTAIN ETIOLOGY: ICD-10-CM

## 2025-04-10 DIAGNOSIS — Z59.82 TRANSPORTATION INSECURITY: ICD-10-CM

## 2025-04-10 DIAGNOSIS — R60.0 BILATERAL LEG EDEMA: Primary | ICD-10-CM

## 2025-04-10 DIAGNOSIS — M79.605 ACUTE LEG PAIN, LEFT: ICD-10-CM

## 2025-04-10 PROCEDURE — 99214 OFFICE O/P EST MOD 30 MIN: CPT

## 2025-04-10 RX ORDER — HYDROCHLOROTHIAZIDE 25 MG/1
25 TABLET ORAL DAILY
Qty: 90 TABLET | Refills: 0 | Status: SHIPPED | OUTPATIENT
Start: 2025-04-10

## 2025-04-10 RX ORDER — TRAMADOL HYDROCHLORIDE 50 MG/1
50 TABLET ORAL 2 TIMES DAILY PRN
Qty: 14 TABLET | Refills: 0 | Status: SHIPPED | OUTPATIENT
Start: 2025-04-10

## 2025-04-10 SDOH — ECONOMIC STABILITY - TRANSPORTATION SECURITY: TRANSPORTATION INSECURITY: Z59.82

## 2025-04-10 NOTE — PROGRESS NOTES
Subjective:   Ariana Dumont is a 59 year old female who presents for Leg Swelling     Presents with c/c left foot and leg swelling for the past 3 days, in the setting of chronic bilateral leg swelling for months to a year based on previous records. She woke up with pain in her foot in the middle of the night 3 days ago. Swelling and pain have improved since then but still present.     BP very elevated in office - monitors at home, better in the morning and goes up throughout the day   This morning was 157/72 and this has been the average but can go up to 170s   Has been stressed and anxious,  is being treated for lung cancer      Currently endorses a pounding headache on the right side of her head   Sinuses feel pressure and it goes all the way to the back of her head - has had this since COVID started so many years  Has history of sinus problems   Has tried allergy medication, nasal sprays, nothing has worked   No nasal drainage   No recent fevers   No other neurological symptom     Has pain in both legs from standing and taking care of   Taking tylenol arthritis with no improvement   Takes diazepam every so often for insomnia    A few days ago while cooking she had a pain from her left shoulder, down her left arm and the left side of her chest   It resolved on its own   Has had the same pain a few times recently   She does not believe she could do a stress test due to leg pain and is worried about a Lexiscan test due to her allergies to contrast dye    Per RN triage note  Last night I woke up in bad pain, in my left foot, from my ankle and threw the foot. It felt like the top, had a mussle pulling. Right in the middle. I could hardly walk to the front room. Now both feet are hurting like that. The feet are starting to swell, and my legs are hurting and swelled. I have arthritis and take them, but only helps a bit. I'm taking care of my , who is getting radiation treatment for lung cancer. So  I can't rest much. What should I try, to manage the pain?     History/Other:    Chief Complaint Reviewed and Verified  No Further Nursing Notes to   Review  Tobacco Reviewed  Allergies Reviewed  Medications Reviewed  OB   Status Reviewed         Tobacco:  Tobacco Use[1]  E-Cigarettes/Vaping       Questions Responses    E-Cigarette Use Never User           Current Medications[2]    Review of Systems:  Review of Systems  10 point review of systems otherwise negative with the exception of HPI and assessment and plan    Objective:   BP (!) 178/70   Pulse 75   Wt 170 lb (77.1 kg)   SpO2 96%   BMI 29.18 kg/m²  Estimated body mass index is 29.18 kg/m² as calculated from the following:    Height as of 25: 5' 4\" (1.626 m).    Weight as of this encounter: 170 lb (77.1 kg).  Physical Exam  Vitals reviewed.   Constitutional:       General: She is not in acute distress.     Appearance: Normal appearance. She is well-developed.   Cardiovascular:      Rate and Rhythm: Normal rate and regular rhythm.      Heart sounds: Normal heart sounds.   Pulmonary:      Effort: Pulmonary effort is normal.      Breath sounds: Normal breath sounds.   Musculoskeletal:      Right lower leg: 3+ Edema present.      Left lower le+ Edema present.      Comments: No redness or warmth to BLE and negative Toni sign   Skin:     General: Skin is warm and dry.   Neurological:      Mental Status: She is alert and oriented to person, place, and time.       Assessment & Plan:   1. Bilateral leg edema (Primary)  -     CARD ECHO 2D DOPPLER (CPT=93306); Future; Expected date: 04/10/2025  -     US VENOUS DOPPLER LEG BILAT - DIAG IMG (CPT=93970); Future; Expected date: 04/10/2025  2. Transportation insecurity  3. Essential hypertension  -     hydroCHLOROthiazide; Take 1 tablet (25 mg total) by mouth daily.  Dispense: 90 tablet; Refill: 0  Not well controlled, add hydrochlorothiazide  Monitor at home and send readings to the office   4. Acute leg  pain, left  -     traMADol HCl; Take 1 tablet (50 mg total) by mouth 2 (two) times daily as needed for Pain.  Dispense: 14 tablet; Refill: 0  5. Chest pain of uncertain etiology  Discussed I would recommend stress test given her chest pain, she will think about it for now, does not believe she could run on the treadmill and is nervous about medications given during Lexiscan     Lucina Shine, APRN, 4/10/2025, 11:53 AM          [1]   Social History  Tobacco Use   Smoking Status Heavy Smoker    Current packs/day: 1.50    Average packs/day: 1.5 packs/day for 45.0 years (67.5 ttl pk-yrs)    Types: Cigarettes    Start date: 4/4/1980    Passive exposure: Current   Smokeless Tobacco Never   [2]   Current Outpatient Medications   Medication Sig Dispense Refill    hydroCHLOROthiazide 25 MG Oral Tab Take 1 tablet (25 mg total) by mouth daily. 90 tablet 0    traMADol 50 MG Oral Tab Take 1 tablet (50 mg total) by mouth 2 (two) times daily as needed for Pain. 14 tablet 0    Cholecalciferol (VITAMIN D3) 25 MCG (1000 UT) Oral Cap Take 1 tablet by mouth daily.      albuterol 108 (90 Base) MCG/ACT Inhalation Aero Soln Inhale 2 puffs into the lungs every 6 (six) hours as needed for Wheezing or Shortness of Breath.      Multiple Vitamins-Minerals (ONE-A-DAY WOMENS 50 PLUS OR) Take by mouth.      B Complex-C-Folic Acid Oral Tab Take by mouth.      diphenhydrAMINE 25 MG Oral Cap Take 1 capsule (25 mg total) by mouth every 6 (six) hours as needed for Itching.      Lansoprazole 15 MG Oral Capsule Delayed Release Take 1 capsule (15 mg total) by mouth daily. 90 capsule 3    amLODIPine 10 MG Oral Tab Take 1 tablet (10 mg total) by mouth daily. 90 tablet 1

## 2025-04-14 ENCOUNTER — TELEPHONE (OUTPATIENT)
Age: 60
End: 2025-04-14

## 2025-04-14 ENCOUNTER — HOSPITAL ENCOUNTER (OUTPATIENT)
Dept: CT IMAGING | Facility: HOSPITAL | Age: 60
Discharge: HOME | End: 2025-04-14
Attending: STUDENT IN AN ORGANIZED HEALTH CARE EDUCATION/TRAINING PROGRAM
Payer: MEDICAID

## 2025-04-14 DIAGNOSIS — R59.9 LYMPH NODES ENLARGED: ICD-10-CM

## 2025-04-14 PROCEDURE — 71250 CT THORAX DX C-: CPT | Performed by: STUDENT IN AN ORGANIZED HEALTH CARE EDUCATION/TRAINING PROGRAM

## 2025-04-14 PROCEDURE — 74176 CT ABD & PELVIS W/O CONTRAST: CPT | Performed by: STUDENT IN AN ORGANIZED HEALTH CARE EDUCATION/TRAINING PROGRAM

## 2025-04-14 PROCEDURE — 70490 CT SOFT TISSUE NECK W/O DYE: CPT | Performed by: STUDENT IN AN ORGANIZED HEALTH CARE EDUCATION/TRAINING PROGRAM

## 2025-04-14 NOTE — PROGRESS NOTES
Please relay to pt has multiple enlarged lymph nodes, needs to have stat evaluation by Oncology for STAT evaluation and follow up with her

## 2025-04-14 NOTE — TELEPHONE ENCOUNTER
Pt is calling to schedule a new consultation appt.    New Consult- Ariana Dumont 7/13/1965  Referring to: Dr. Cade  Referral by: Dr. Mateo Howard PH:  Reason- Retroperitoneal lymphadenopathy   Insurance- [facility] (E-verified)  Referral: in Epic  Please give pt a call back. Thank you.

## 2025-04-15 ENCOUNTER — TELEPHONE (OUTPATIENT)
Dept: CASE MANAGEMENT | Age: 60
End: 2025-04-15

## 2025-04-15 NOTE — TELEPHONE ENCOUNTER
Peer to peer scheduled for Friday 4/18/25 at 1:30pm   Will be receiving phone call to Providers Dr Howard cell

## 2025-04-15 NOTE — TELEPHONE ENCOUNTER
Lets do Peer to Peer, let them read the results as had Lymphadenopathy and several abnormal findings likely malignant

## 2025-04-15 NOTE — TELEPHONE ENCOUNTER
CT Chest/Abdomen/Pelvis        Status: DENIED        Reference number [id number] and [id number]     A copy of the denial letter is filed under the MEDIA tab, reference for complete details. You may reach out to Kizzy at [phone] to discuss decision.     Test was ordered as urgent and patient completed tests.      Thank you

## 2025-04-16 ENCOUNTER — OFFICE VISIT (OUTPATIENT)
Age: 60
End: 2025-04-16
Attending: INTERNAL MEDICINE
Payer: MEDICAID

## 2025-04-16 VITALS
WEIGHT: 169.38 LBS | OXYGEN SATURATION: 98 % | SYSTOLIC BLOOD PRESSURE: 162 MMHG | TEMPERATURE: 98 F | HEIGHT: 62 IN | HEART RATE: 83 BPM | RESPIRATION RATE: 18 BRPM | DIASTOLIC BLOOD PRESSURE: 76 MMHG | BODY MASS INDEX: 31.17 KG/M2

## 2025-04-16 DIAGNOSIS — R91.1 LUNG NODULE: ICD-10-CM

## 2025-04-16 DIAGNOSIS — M79.89 LEG SWELLING: ICD-10-CM

## 2025-04-16 DIAGNOSIS — R59.0 RETROPERITONEAL LYMPHADENOPATHY: ICD-10-CM

## 2025-04-16 DIAGNOSIS — J32.0 CHRONIC MAXILLARY SINUSITIS: Primary | ICD-10-CM

## 2025-04-16 DIAGNOSIS — Z72.0 TOBACCO USE: ICD-10-CM

## 2025-04-17 ENCOUNTER — HOSPITAL ENCOUNTER (OUTPATIENT)
Dept: ULTRASOUND IMAGING | Facility: HOSPITAL | Age: 60
Discharge: HOME | End: 2025-04-17
Payer: MEDICAID

## 2025-04-17 DIAGNOSIS — R60.0 BILATERAL LEG EDEMA: ICD-10-CM

## 2025-04-17 PROCEDURE — 93970 EXTREMITY STUDY: CPT

## 2025-04-17 NOTE — CONSULTS
[facility] Hematology/Oncology Consultation Note    Patient Name: Ariana Dumont   YOB: 1965   Medical Record Number: [id number]   CSN: [id number]   Consulting Physician: Edil Cade MD  Referring Physician(s): Dr Mateo Howard MD  Date of Consultation: 4/17/2025     Reason for Consultation:  retroperitoneal lymphadenopathy    History of Present Illness:   Ariana Duomnt is a 59 year old female that was [facility] in [facility] for abnormal CT And r/p LN.  Her history is detailed below    She has had a history of pancreatitis thought secondary to cholelithiasis.  She was recommended cholecystectomy but has not undergone this.  She had establish care with a new PCP Dr. Howard on 4/2/2025 and had reported multiple complaints including generalized body aches as well as a palpable lymph node in the right side of the neck.  She had also reported history of large lymph nodes in her lower abdomen.    4/14/2025 CT of the neck chest abdomen pelvis without contrast due to iodinated contrast allergy showed no pathologic lymphadenopathy in the neck however showed chronic inflammation within the maxillary ethmoid sinuses.  2 lung nodules largest measuring 8 mm in the right lower lobe.    No abnormality in the liver.  There was some subcentimeter  lymph nodes within the retroperitoneum with a single 10 x 14 mm aortocaval lymph node  There were prominent lymph nodes in the bilateral femoral and inguinal chains largest measuring up to 12 mm.    She also reports chronic lower extremity edema and stasis changes.  She has been smoking for several decades but has cut back.  Her  is undergoing treatment with me for lung cancer.    Past Medical History:  Past Medical History[1]    Past Surgical History:  Past Surgical History[2]    Family Medical History:  Family History[3]    Gyne History:  OB History   No obstetric history on file.       Social History:  Social History     Socioeconomic History     Marital status:      Spouse name: Not on file    Number of children: Not on file    Years of education: Not on file    Highest education level: Not on file   Occupational History    Not on file   Tobacco Use    Smoking status: Heavy Smoker     Current packs/day: 1.50     Average packs/day: 1.5 packs/day for 45.0 years (67.6 ttl pk-yrs)     Types: Cigarettes     Start date: 4/4/1980     Passive exposure: Current    Smokeless tobacco: Never   Vaping Use    Vaping status: Never Used   Substance and Sexual Activity    Alcohol use: Never    Drug use: Not Currently     Types: \"Crack\" cocaine, Cannabis     Comment: Last use 2000    Sexual activity: Not on file   Other Topics Concern    Not on file   Social History Narrative    Not on file     Social Drivers of Health     Food Insecurity: No Food Insecurity (4/10/2025)    NCSS - Food Insecurity     Worried About Running Out of Food in the Last Year: No     Ran Out of Food in the Last Year: No   Transportation Needs: Unmet Transportation Needs (4/10/2025)    NCSS - Transportation     Lack of Transportation: Yes   Housing Stability: Not At Risk (4/10/2025)    NCSS - Housing/Utilities     Has Housing: Yes     Worried About Losing Housing: No     Unable to Get Utilities: No       Allergies:   Allergies[4]    Current Medications:  Current Medications[5]    Review of Systems:  A comprehensive 14 point review of systems was completed.  Pertinent positives and negatives noted in the the HPI.     Vital Signs:  BP (!) 162/76 (BP Location: Left arm, Patient Position: Sitting, Cuff Size: adult)   Pulse 83   Temp 97.9 °F (36.6 °C)   Resp 18   Ht 1.575 m (5' 2\")   Wt 76.8 kg (169 lb 6.4 oz)   SpO2 98%   BMI 30.98 kg/m²     Physical Examination:    General: Patient is alert and oriented x 3, not in acute distress.  HEENT: EOMs intact. PERRL. Oropharynx is clear.   Neck: No JVD. No palpable lymphadenopathy. Neck is supple.  Lymphatics: There is no palpable lymphadenopathy  throughout in the cervical, supraclavicular or axillary regions.  Chest: Clear to auscultation. No wheezes or rales.  Heart: Regular rate and rhythm. S1S2 normal.  Abdomen: Soft, non tender, no hepatosplenomegaly.  No palpable mass.  Extremities: Mild pitting edema bilaterally with stasis changes   Neurological: Grossly intact.     Performance Status:    ECOG-1    Labs:    Lab Results   Component Value Date/Time    WBC 10.1 04/04/2025 11:27 AM    RBC 4.80 04/04/2025 11:27 AM    HGB 15.6 04/04/2025 11:27 AM    HCT 43.8 04/04/2025 11:27 AM    MCV 91.3 04/04/2025 11:27 AM    MCH 32.5 04/04/2025 11:27 AM    MCHC 35.6 04/04/2025 11:27 AM    RDW 12.0 04/04/2025 11:27 AM    NEPRELIM 7.20 04/04/2025 11:27 AM    .0 04/04/2025 11:27 AM       Lab Results   Component Value Date/Time    GLU 95 04/04/2025 11:27 AM    BUN <5 (L) 04/04/2025 11:27 AM    CREATSERUM 0.65 04/04/2025 11:27 AM    GFRNAA 89 08/13/2019 12:13 AM    CA 9.5 04/04/2025 11:27 AM    ALB 4.8 04/04/2025 11:27 AM     04/04/2025 11:27 AM    K 4.2 04/04/2025 11:27 AM     04/04/2025 11:27 AM    CO2 26.0 04/04/2025 11:27 AM    ALKPHO 99 04/04/2025 11:27 AM    AST 19 04/04/2025 11:27 AM    ALT 19 04/04/2025 11:27 AM         Impression:  Diagnosis  1. Chronic maxillary sinusitis    2. Leg swelling    3. Lung nodule    4. Retroperitoneal lymphadenopathy    5. Tobacco use      59-year-old female referred here for evaluation of a single isolated retroperitoneal lymph node that is mildly enlarged . Recent CBC and sed rate were entirely normal and there is no other lymphadenopathy or splenomegaly to suggest an underlying lymphoproliferative disorder.  I suspect this is likely reactive    Plan:  I explained to the patient that the retroperitoneal lymphadenopathy does not appear pathologic but would recommend follow-up with a repeat CT scan in 3 months  She did not have any pathologic lymphadenopathy in the neck but does have some evidence of sinusitis.   Given her chronic rhinosinusitis issues I have recommended ENT evaluation and a trial of Augmentin  Lung nodules are subcentimeter.  Recommend repeat CT chest in 6 months  Tobacco use: Tobacco cessation counseling for 3-10 minutes (add E/M code #31935).  RTC for fu in 3 mos with CT  LE swelling: likely chronic venous insufficiency, rec compression stockings, await venous doppler      Emotional Well Being:    Emotional Well Being discussed, patient aware of support systems available through the Cancer Center. No acute issues.     The diagnosis, prognosis, treatment goals, plan for treatment, and expected response was explained to the patient.       Thank you Dr Mateo Howard  for the opportunity to participate in the care of this interesting patient. Please do contact me if I may be of any further assistance    Edil Cade MD  [facility]     Mod complex MDM       [1]   Past Medical History:   Arthritis    Asthma (HCC)    Essential hypertension    Seizure disorder (HCC)   [2]   Past Surgical History:  Procedure Laterality Date    Other surgical history      reconstructive bladder   [3]   Family History  Problem Relation Age of Onset    Stroke Mother     Other (lung cancer) Father    [4]   Allergies  Allergen Reactions    Iodine (Topical) ANAPHYLAXIS    Radiology Contrast Iodinated Dyes SWELLING    Sulfa Antibiotics UNKNOWN   [5]    amoxicillin clavulanate 875-125 MG Oral Tab Take 1 tablet by mouth 2 (two) times daily. 20 tablet 0    hydroCHLOROthiazide 25 MG Oral Tab Take 1 tablet (25 mg total) by mouth daily. 90 tablet 0    traMADol 50 MG Oral Tab Take 1 tablet (50 mg total) by mouth 2 (two) times daily as needed for Pain. 14 tablet 0    Cholecalciferol (VITAMIN D3) 25 MCG (1000 UT) Oral Cap Take 1 tablet by mouth in the morning.      albuterol 108 (90 Base) MCG/ACT Inhalation Aero Soln Inhale 2 puffs into the lungs every 6 (six) hours as needed for Wheezing or Shortness of Breath.       Multiple Vitamins-Minerals (ONE-A-DAY WOMENS 50 PLUS OR) Take by mouth.      B Complex-C-Folic Acid Oral Tab Take by mouth.      diphenhydrAMINE 25 MG Oral Cap Take 1 capsule (25 mg total) by mouth every 6 (six) hours as needed for Itching.      Lansoprazole 15 MG Oral Capsule Delayed Release Take 1 capsule (15 mg total) by mouth daily. 90 capsule 3    amLODIPine 10 MG Oral Tab Take 1 tablet (10 mg total) by mouth daily. 90 tablet 1

## 2025-04-29 ENCOUNTER — OFFICE VISIT (OUTPATIENT)
Dept: OBGYN CLINIC | Facility: CLINIC | Age: 60
End: 2025-04-29
Payer: MEDICAID

## 2025-04-29 VITALS
SYSTOLIC BLOOD PRESSURE: 167 MMHG | BODY MASS INDEX: 28.16 KG/M2 | HEIGHT: 65 IN | WEIGHT: 169 LBS | DIASTOLIC BLOOD PRESSURE: 77 MMHG

## 2025-04-29 DIAGNOSIS — N76.0 VAGINITIS AND VULVOVAGINITIS: ICD-10-CM

## 2025-04-29 DIAGNOSIS — R10.2 PELVIC PAIN: Primary | ICD-10-CM

## 2025-04-29 DIAGNOSIS — R39.15 URINARY URGENCY: ICD-10-CM

## 2025-04-29 DIAGNOSIS — Z12.31 ENCOUNTER FOR SCREENING MAMMOGRAM FOR MALIGNANT NEOPLASM OF BREAST: ICD-10-CM

## 2025-04-29 DIAGNOSIS — M62.89 PELVIC FLOOR DYSFUNCTION IN FEMALE: ICD-10-CM

## 2025-04-29 PROCEDURE — 99214 OFFICE O/P EST MOD 30 MIN: CPT | Performed by: STUDENT IN AN ORGANIZED HEALTH CARE EDUCATION/TRAINING PROGRAM

## 2025-04-29 RX ORDER — ACETAMINOPHEN AND CODEINE PHOSPHATE 300; 30 MG/1; MG/1
1 TABLET ORAL
COMMUNITY

## 2025-04-29 RX ORDER — ONDANSETRON 4 MG/1
TABLET, ORALLY DISINTEGRATING ORAL
COMMUNITY
Start: 2025-01-23

## 2025-04-29 RX ORDER — LIDOCAINE 50 MG/G
PATCH TOPICAL
COMMUNITY
Start: 2025-01-18

## 2025-04-29 RX ORDER — PANTOPRAZOLE SODIUM 40 MG/1
TABLET, DELAYED RELEASE ORAL
COMMUNITY
Start: 2025-01-23

## 2025-04-29 RX ORDER — ESTRADIOL 0.1 MG/G
1 CREAM VAGINAL
Qty: 42.5 G | Refills: 1 | Status: SHIPPED | OUTPATIENT
Start: 2025-04-29

## 2025-04-29 NOTE — PROGRESS NOTES
[facility]  Obstetrics and Gynecology  Gynecology New Patient  Exam    Chief Complaint   Patient presents with    Annual     Reviewed Preventative/Wellness form with patient.       Last pap  , pain in both ovaries, had tubal ligation        History of Present Illness              Ariana Dumont is a 59 year old female presenting as a new patient for RLQ pain.    Reports intermittent RLQ sharp pain, comes and goes. Notices it worse with position changes/leaning down and sittng in certain positions. Stretching out sometimes helps. Tylenol arthritis helps somewhat but can still feel with certain movements. No vaginal bleeding. No nausea or vomiting, no fevers. . Changes in discharge. . Also has breast sore.     Reports h/o ovarian cysts 20-30 years ago that spontaneously resolved.     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 When having periods were q28d but did skip around.   H/o tubal ligation at age 21. H/o urinary reconstruction (?) at age 13.   Reports no h/o abnormal Pap. Last Pap   and NILM.   No h/o STI  Reports  h/o fibroids or ovarian cysts.  - remote  Has  used birth control in the past; pills >10 years.   Is no currently sexually active, and reports no problems with intercourse.   Reports some issues with bowel or bladder function -- some    Focused Fhx:   + fhx of cancer: father with lung  + h/o VTE in family -> mom had a stroke  No h/o fragility fracture or hip fracture or osteoporosis in either parent. --       Medications (Active prior to today's visit):  Current Medications[1]  Allergies:  Allergies[2]  HISTORY:     OB History    Para Term  AB Living   3 2 2  1    SAB IAB Ectopic Multiple Live Births    1         # Outcome Date GA Lbr Douglas/2nd Weight Sex Type Anes PTL Lv   3 IAB            2 Term     M Vag-Spont      1 Term     F Vag-Spont          Past Medical History[3]    Past Surgical  History[4]    Family History[5]    Social History     Socioeconomic History    Marital status:      Spouse name: Not on file    Number of children: Not on file    Years of education: Not on file    Highest education level: Not on file   Occupational History    Not on file   Tobacco Use    Smoking status: Heavy Smoker     Current packs/day: 1.50     Average packs/day: 1.5 packs/day for 45.1 years (67.6 ttl pk-yrs)     Types: Cigarettes     Start date: 4/4/1980     Passive exposure: Current    Smokeless tobacco: Never   Vaping Use    Vaping status: Never Used   Substance and Sexual Activity    Alcohol use: Never    Drug use: Never     Types: \"Crack\" cocaine, Cannabis     Comment: Last use 2000    Sexual activity: Not on file   Other Topics Concern    Not on file   Social History Narrative    Not on file     Social Drivers of Health     Food Insecurity: No Food Insecurity (4/10/2025)    NCSS - Food Insecurity     Worried About Running Out of Food in the Last Year: No     Ran Out of Food in the Last Year: No   Transportation Needs: Unmet Transportation Needs (4/10/2025)    NCSS - Transportation     Lack of Transportation: Yes   Stress: Not on file   Housing Stability: Not At Risk (4/10/2025)    NCSS - Housing/Utilities     Has Housing: Yes     Worried About Losing Housing: No     Unable to Get Utilities: No       ROS:   Review of Systems:    General: no fevers, chills, unintended weight loss/gain except as above  Cardiovascular: no chest pain, new or unexplained SOB except as above  Gastrointestinal: no nausea/vomiting, diarrhea, or blood in stool except as above  Respiratory: no new symptoms reported except as above  Skin: no new symptoms reported except as above  Psychiatric: no new symptoms reported except as above  PHYSICAL EXAM:   BP (!) 167/77   Ht 5' 5\" (1.651 m)   Wt 169 lb (76.7 kg)   BMI 28.12 kg/m²     Physical Exam  HENT:      Head: Normocephalic and atraumatic.   Eyes:      Extraocular Movements:  Extraocular movements intact.   Pulmonary:      Effort: Pulmonary effort is normal.   Chest:      Comments: Small superficial left breast abrasion, questionably boil or irritation from moisture. Breast exam otherwise deferred  Abdominal:      Palpations: Abdomen is soft.   Genitourinary:     Comments: Atrophic vulva and vagina with some anterior prolapse and increased pelvic floor tone and pain to palpation along the proximal ilococcygeus (R>L). No discharge or lesions.      External vulva somewhat erythematous with signs of irritation, no specific ulceration or masses seen and with mild acetowhite changes along the right labia minora and introitus   Musculoskeletal:      Cervical back: Normal range of motion.   Skin:     General: Skin is warm and dry.   Neurological:      General: No focal deficit present.      Mental Status: She is alert. Mental status is at baseline.   Psychiatric:         Behavior: Behavior normal.         Thought Content: Thought content normal.             RESULTS & IMAGING     10/2023 Pap NILM HPV negative (prev 2019 negative per [facility] note)    11/2023 Pelvic US ([facility]) FINDINGS:   Transabdominal and transvaginal sonogram of the pelvis obtained.     The uterus is anteverted in position measuring 7.2 cm in length.  The myometrium   is without focal fibroids.     Endometrium measures 4 mm in maximum thickness.  Small amount of fluid in the   endometrium and endocervical canal.     The right ovary measures 2.6 x 1.4 x 2.2 cm.  The left ovary measures 1.8 x 1.8   x 1.8 cm. Spectral analysis shows ovarian arterial waveforms without reversed   diastole and patent interrogated venous system showing monophasic waveform on   spectral analysis. No adnexal solid or complex cystic masses seen.    No pelvic free or loculated fluid collections identified.     2019 Pelvic US: Endometrium 2-3 mm, normal ovaries    11/2023 Mammogram   FINDINGS: No significant masses, suspicious calcifications, or  other findings  suggestive of malignancy. Several scattered benign bilateral calcifications are  noted. A well-circumscribed fat-containing 0.7 cm mass is noted in the lower  inner right breast, suggesting a cyst or benign intramammary lymph node. Both  axillary regions have a benign appearance.    IMPRESSION:  No evidence of malignancy.    RECOMMENDATION: Routine screening mammogram in one year. Clinical management of  the patient's breast pain. Comparison with prior outside mammograms.    BI-RADS 2: Benign      No results for input(s): \"URINEPREG\" in the last 72 hours.    No results for input(s): \"PGLU\", \"POCTGLUCOSE\" in the last 72 hours.    No results for input(s): \"GLUCOSEDIP\", \"BILIRUBIN\", \"KETONESDIP\", \"BLOODU\", \"PHURINE\", \"UROBILIN\", \"NITRITE\", \"LEUKOCYTES\", \"APPEARANCE\", \"URINECOLOR\" in the last 72 hours.    Invalid input(s): \"SPECGRAV\"       ASSESSMENT & PLAN     Pelvic pain (Primary)  -     Pelvic Floor Therapy - [place] Location  -     US PELVIS (TRANSABDOMINAL AND TRANSVAGINAL) (CPT=76856/56962); Future; Expected date: 04/29/2025  Urinary urgency  -     Pelvic Floor Therapy - [place] Location  -     Urology Referral - In Network  Pelvic floor dysfunction in female  -     Pelvic Floor Therapy - [facility]  -     Urology Referral - In Network  Encounter for screening mammogram for malignant neoplasm of breast  -     [facility] JAISON 2D+3D SCREENING BILAT (CPT=77067/12675); Future; Expected date: 04/29/2025  Vaginitis and vulvovaginitis  -     Vaginitis Vaginosis PCR Panel; Future; Expected date: 04/29/2025  Other orders  -     Estradiol; Place 1 g vaginally twice a week.  Dispense: 42.5 g; Refill: 1    Unlikely ovarian pain, more likely MSK (pelvic floor vs arthritis) - PFPT referral placed and encouraged discussion with PCP  Urge incontinence may be contributing - discussed vulvar hygiene + vaginal estrogen + referral to urology Dr Valdivia given additional management of pelvic floor  BV/yeast swab  collected given previous symptoms which can contribute to vulvar irritation  Recommended decrease in tobacco use given risk of tobacco use and vulvar malignancy      Due for a mammogram - ordered  Not yet due for Pap - 2026    Caryn Hills MD  4/29/2025  9:41 AM               [1]   Current Outpatient Medications   Medication Sig Dispense Refill    acetaminophen-codeine 300-30 MG Oral Tab Take 1 tablet by mouth.      lidocaine 5 % External Patch APPLY ONE PATCH TOPICALLY TO THE AFFECTED AREA EVERY MORNING AS DIRECTED      ondansetron 4 MG Oral Tablet Dispersible       pantoprazole 40 MG Oral Tab EC       estradiol (ESTRACE) 0.1 MG/GM Vaginal Cream Place 1 g vaginally twice a week. 42.5 g 1    amoxicillin clavulanate 875-125 MG Oral Tab Take 1 tablet by mouth 2 (two) times daily. 20 tablet 0    hydroCHLOROthiazide 25 MG Oral Tab Take 1 tablet (25 mg total) by mouth daily. 90 tablet 0    traMADol 50 MG Oral Tab Take 1 tablet (50 mg total) by mouth 2 (two) times daily as needed for Pain. 14 tablet 0    Cholecalciferol (VITAMIN D3) 25 MCG (1000 UT) Oral Cap Take 1 tablet by mouth in the morning.      albuterol 108 (90 Base) MCG/ACT Inhalation Aero Soln Inhale 2 puffs into the lungs every 6 (six) hours as needed for Wheezing or Shortness of Breath.      Multiple Vitamins-Minerals (ONE-A-DAY WOMENS 50 PLUS OR) Take by mouth.      B Complex-C-Folic Acid Oral Tab Take by mouth.      diphenhydrAMINE 25 MG Oral Cap Take 1 capsule (25 mg total) by mouth every 6 (six) hours as needed for Itching.      Lansoprazole 15 MG Oral Capsule Delayed Release Take 1 capsule (15 mg total) by mouth daily. 90 capsule 3    amLODIPine 10 MG Oral Tab Take 1 tablet (10 mg total) by mouth daily. 90 tablet 1   [2]   Allergies  Allergen Reactions    Iodine (Topical) ANAPHYLAXIS    Nsaids OTHER (SEE COMMENTS)     Ulcers    Radiology Contrast Iodinated Dyes SWELLING    Sulfa Antibiotics UNKNOWN   [3]   Past Medical History:   Allergic rhinitis     Anxiety    Arthritis    Asthma (HCC)    Constipation    Depression    Esophageal reflux    Essential hypertension    IBS (irritable bowel syndrome)    Osteoarthritis    Seizure disorder (HCC)    Urinary incontinence   [4]   Past Surgical History:  Procedure Laterality Date    Colonoscopy            Other surgical history      reconstructive bladder    Tubal ligation  81   [5]   Family History  Problem Relation Age of Onset    Stroke Mother     Diabetes Mother     Asthma Mother     Other (lung cancer) Father     Cancer Father     Stroke Brother

## 2025-04-30 ENCOUNTER — TELEPHONE (OUTPATIENT)
Dept: OBGYN CLINIC | Facility: CLINIC | Age: 60
End: 2025-04-30

## 2025-04-30 LAB
BV BACTERIA DNA VAG QL NAA+PROBE: NEGATIVE
C GLABRATA DNA VAG QL NAA+PROBE: POSITIVE
C KRUSEI DNA VAG QL NAA+PROBE: NEGATIVE
CANDIDA DNA VAG QL NAA+PROBE: NEGATIVE
T VAGINALIS DNA VAG QL NAA+PROBE: NEGATIVE

## 2025-05-07 ENCOUNTER — OFFICE VISIT (OUTPATIENT)
Dept: INTERNAL MEDICINE CLINIC | Facility: CLINIC | Age: 60
End: 2025-05-07
Payer: MEDICAID

## 2025-05-07 ENCOUNTER — NURSE TRIAGE (OUTPATIENT)
Dept: INTERNAL MEDICINE CLINIC | Facility: CLINIC | Age: 60
End: 2025-05-07

## 2025-05-07 ENCOUNTER — SPINE CENTER NAVIGATION (OUTPATIENT)
Age: 60
End: 2025-05-07

## 2025-05-07 VITALS
HEART RATE: 86 BPM | HEIGHT: 65 IN | WEIGHT: 175.63 LBS | DIASTOLIC BLOOD PRESSURE: 72 MMHG | BODY MASS INDEX: 29.26 KG/M2 | TEMPERATURE: 98 F | OXYGEN SATURATION: 98 % | SYSTOLIC BLOOD PRESSURE: 140 MMHG

## 2025-05-07 DIAGNOSIS — Z59.41 FOOD INSECURITY: ICD-10-CM

## 2025-05-07 DIAGNOSIS — M54.12 CERVICAL RADICULOPATHY: Primary | ICD-10-CM

## 2025-05-07 DIAGNOSIS — I10 ESSENTIAL HYPERTENSION: ICD-10-CM

## 2025-05-07 DIAGNOSIS — Z59.82 TRANSPORTATION INSECURITY: ICD-10-CM

## 2025-05-07 PROBLEM — K85.90 ACUTE PANCREATITIS (HCC): Status: ACTIVE | Noted: 2025-05-07

## 2025-05-07 PROBLEM — M54.2 CERVICALGIA: Status: ACTIVE | Noted: 2024-06-04

## 2025-05-07 PROBLEM — R52 PAIN OF RIGHT SIDE OF BODY: Status: ACTIVE | Noted: 2024-06-04

## 2025-05-07 PROBLEM — R10.9 LEFT SIDED ABDOMINAL PAIN: Status: ACTIVE | Noted: 2020-09-22

## 2025-05-07 PROBLEM — R51.9 CHRONIC DAILY HEADACHE: Status: ACTIVE | Noted: 2024-06-04

## 2025-05-07 PROBLEM — K52.9 COLITIS: Status: ACTIVE | Noted: 2017-10-25

## 2025-05-07 PROCEDURE — 99214 OFFICE O/P EST MOD 30 MIN: CPT | Performed by: NURSE PRACTITIONER

## 2025-05-07 RX ORDER — METHOCARBAMOL 750 MG/1
750 TABLET, FILM COATED ORAL 3 TIMES DAILY PRN
Qty: 30 TABLET | Refills: 0 | Status: SHIPPED | OUTPATIENT
Start: 2025-05-07

## 2025-05-07 RX ORDER — NAPROXEN 500 MG/1
500 TABLET ORAL 2 TIMES DAILY WITH MEALS
Qty: 14 TABLET | Refills: 0 | Status: SHIPPED | OUTPATIENT
Start: 2025-05-07 | End: 2025-05-14

## 2025-05-07 SDOH — ECONOMIC STABILITY - TRANSPORTATION SECURITY: TRANSPORTATION INSECURITY: Z59.82

## 2025-05-07 SDOH — ECONOMIC STABILITY - FOOD INSECURITY: FOOD INSECURITY: Z59.41

## 2025-05-07 NOTE — TELEPHONE ENCOUNTER
tingling in my arms and neck.  (Newest Message First)             Ariana Whitman Rn Triage (supporting SEJAL Burgess) (Selected Message)  TS      5/6/25  8:50 PM  My arms are tingling. pain in a mussle in the right side of my neck. I don't know what, I should use, to stop the    the pain and numbness. And the odd pain in my neck, jaw, and throat. I checked my blood pressure and heart beats, both are fine. It might be part of the degenertive disc dease. I really don't know.  Any advice how to stop the pain and numbness?

## 2025-05-07 NOTE — PROGRESS NOTES
Spine Center Referral Navigation Encounter Note    Referred by: SEJAL Santiago    Imaging: No recent imaging. XR Cervical Spine ordered  If imaging done at an external facility, instructed patient to bring disc of MRI to appointment.     Previously Seen Spine Care Providers: None    Referred to: Federico De Los Santos MD in Physiatry     Information below is patient reported.     [facility]  Are you currently experiencing any of the following symptoms?      No    Is your condition due to an injury that occurred at work or is your care for this condition being coordinated by Worker's Compensation?      No    Are you looking for a second surgical opinion?      No    Have you had surgery on your spine (neck or back) in the last 12 months?      No    In the last several weeks, have you experienced weakness or balance issues that have caused falls or difficulty lifting your legs or feet (lower body) and/or weakness that has caused you to drop items or caused changes in handwriting (upper body)?      No    In the last 3 months, have you had spine injections AND physical therapy for your back or neck that did not improve your symptoms?      No    : Patient needs to be seen by non-surgical team. Does patient require a new visit or established visit?      New patient visit    Are you closer to [place] or [facility]?      [facility]         5/7-  Spoke to patient but she informed me she is still on her way home. Will follow up with her tomorrow morning.    5/9- Spoke to patient and was able to schedule for  5/14 at 2:00 pm.

## 2025-05-07 NOTE — TELEPHONE ENCOUNTER
Please see patient Mychart message below. Patient was called and she stated that her symptoms have been going on for a few weeks now. Patient stated that in the evenings she starts to feel tingling down both arms.Patient also has a pain on her the right side of her neck, jaw and throat. Patient was inform she needs to come in for a evaluation. Patient agreed and she will see SEJAL Santiago.    Future Appointments   Date Time Provider Department Center   5/7/2025  2:30 PM Michelle Augustin APRN [facility]           tingling in my arms and neck.  (Newest Message First)             Ariana Dumont to P Em Rn Triage (supporting SEJAL Burgess) (Selected Message)  TS      5/6/25  8:50 PM  My arms are tingling. pain in a mussle in the right side of my neck. I don't know what, I should use, to stop the    the pain and numbness. And the odd pain in my neck, jaw, and throat. I checked my blood pressure and heart beats, both are fine. It might be part of the degenertive disc dease. I really don't know.  Any advice how to stop the pain and numbness?     Reason for Disposition   Numbness or tingling on both sides of body and is a new symptom lasting > 24 hours    Protocols used: Neurologic Deficit-A-OH

## 2025-05-07 NOTE — PATIENT INSTRUCTIONS
- Please have neck xrays completed  - Naproxen twice daily with food for 7 days, drink plenty of water  - Methocarbamol three times daily as needed for spasms  - Lidocaine patch / Capsaicin cream / Voltaren gel to painful area  - Ice 20 min 3-4 times daily  - Heat, massage, chiropractic, acupuncture    - Referral to Spine Center navigator for recommendations - they will call you

## 2025-05-07 NOTE — PROGRESS NOTES
Subjective:   Ariana Dumont is a 59 year old female with PMH HTN, chronic pain/fibromyalgia who presents for Tingling and Neck Pain     C/o midline neck pain for \"a long time\", but reports she is now having pain every day for the last couple of months.  Was told she has DDD of her neck at age 20. No cspine films in our system or care everywhere to confirm this.  No specific injury or trauma  Her pain is in the middle generally, but sometimes worse in the R neck, and radiates into both arms. Has intermittent shooting pain down both arms (2-3 times daily), and   tingling/numbness down her arms especially at night.  No decreased ROM, no fever or recent illness, no rash.    H/o poorly controlled BP - amlodipine 10, was started on hydrochlorothiazide 25mg on 4/10 by another provider.  Initial reading in office today elevated by 2nd reading improved  States her BP was 126/61 at home.    History/Other:    Chief Complaint Reviewed and Verified  No Further Nursing Notes to   Review  Tobacco Reviewed  Allergies Reviewed  Medications Reviewed    Problem List Reviewed  Medical History Reviewed  Surgical History   Reviewed  OB Status Reviewed  Family History Reviewed  Social History   Reviewed         Tobacco:  Tobacco Use[1]  E-Cigarettes/Vaping       Questions Responses    E-Cigarette Use Never User          E-Cigarette/Vaping Substances       Questions Responses    Nicotine No    THC No    CBD No    Flavoring No          E-Cigarette/Vaping Devices       Questions Responses    Disposable No    Pre-filled or Refillable Cartridge No    Refillable Tank No    Pre-filled Pod No           Tobacco cessation counseling for <3 minutes. States she is not smoking in the house anymore.      Current Medications[2]      Review of Systems:  Review of Systems  10 point review of systems otherwise negative with the exception of HPI and assessment and plan.    Objective:   /72   Pulse 86   Temp 97.5 °F (36.4 °C) (Temporal)    Ht 5' 5\" (1.651 m)   Wt 175 lb 9.6 oz (79.7 kg)   SpO2 98%   BMI 29.22 kg/m²  Estimated body mass index is 29.22 kg/m² as calculated from the following:    Height as of this encounter: 5' 5\" (1.651 m).    Weight as of this encounter: 175 lb 9.6 oz (79.7 kg).      Physical Exam  Vitals reviewed.   Constitutional:       General: She is not in acute distress.  Cardiovascular:      Rate and Rhythm: Normal rate.   Pulmonary:      Effort: Pulmonary effort is normal. No respiratory distress.   Musculoskeletal:      Cervical back: Erythema present. No signs of trauma, rigidity, spasms, tenderness, bony tenderness or crepitus. Pain with movement present. Normal range of motion.   Neurological:      Mental Status: She is alert.      Motor: Motor function is intact.      Comments: BUE motor strength 5/5 biceps/triceps to max resistance         Assessment & Plan:   1. Cervical radiculopathy (Primary)  -     SPINE CENTER CENTRAL REFERRAL FOR NAVIGATION  -     XR CERVICAL SPINE (2-3 VIEWS) (CPT=72040); Future; Expected date: 05/07/2025  -     Naproxen; Take 1 tablet (500 mg total) by mouth 2 (two) times daily with meals for 7 days.  Dispense: 14 tablet; Refill: 0  -     Methocarbamol; Take 1 tablet (750 mg total) by mouth 3 (three) times daily as needed.  Dispense: 30 tablet; Refill: 0  - Naproxen BID with food (states she has been taking otc naproxen at home without upsetting her stomach - see allergy list), and robaxin tid prn.  - Cspine films and spine center referral.  2. Essential Hypertension        - Per HPI - BP improved on 2nd reading in office and is at goal range per home reading.  3. Food insecurity  4. Transportation insecurity        Return if symptoms worsen or fail to improve.    SEJAL Santiago, 5/7/2025, 2:39 PM     This note was prepared using Dragon Medical voice recognition dictation software. As a result errors may occur. When identified, these errors have been corrected. While every attempt is  made to correct errors during dictation discrepancies may still exist.         [1]   Social History  Tobacco Use   Smoking Status Heavy Smoker    Current packs/day: 1.50    Average packs/day: 1.5 packs/day for 45.1 years (67.6 ttl pk-yrs)    Types: Cigarettes    Start date: 4/4/1980    Passive exposure: Current   Smokeless Tobacco Never   [2]   Current Outpatient Medications   Medication Sig Dispense Refill    naproxen 500 MG Oral Tab Take 1 tablet (500 mg total) by mouth 2 (two) times daily with meals for 7 days. 14 tablet 0    methocarbamol 750 MG Oral Tab Take 1 tablet (750 mg total) by mouth 3 (three) times daily as needed. 30 tablet 0    acetaminophen-codeine 300-30 MG Oral Tab Take 1 tablet by mouth.      lidocaine 5 % External Patch APPLY ONE PATCH TOPICALLY TO THE AFFECTED AREA EVERY MORNING AS DIRECTED      ondansetron 4 MG Oral Tablet Dispersible       pantoprazole 40 MG Oral Tab EC       estradiol (ESTRACE) 0.1 MG/GM Vaginal Cream Place 1 g vaginally twice a week. 42.5 g 1    hydroCHLOROthiazide 25 MG Oral Tab Take 1 tablet (25 mg total) by mouth daily. 90 tablet 0    traMADol 50 MG Oral Tab Take 1 tablet (50 mg total) by mouth 2 (two) times daily as needed for Pain. 14 tablet 0    Cholecalciferol (VITAMIN D3) 25 MCG (1000 UT) Oral Cap Take 1 tablet by mouth in the morning.      albuterol 108 (90 Base) MCG/ACT Inhalation Aero Soln Inhale 2 puffs into the lungs every 6 (six) hours as needed for Wheezing or Shortness of Breath.      Multiple Vitamins-Minerals (ONE-A-DAY WOMENS 50 PLUS OR) Take by mouth.      B Complex-C-Folic Acid Oral Tab Take by mouth.      diphenhydrAMINE 25 MG Oral Cap Take 1 capsule (25 mg total) by mouth every 6 (six) hours as needed for Itching.      Lansoprazole 15 MG Oral Capsule Delayed Release Take 1 capsule (15 mg total) by mouth daily. 90 capsule 3    amLODIPine 10 MG Oral Tab Take 1 tablet (10 mg total) by mouth daily. 90 tablet 1    amoxicillin clavulanate 875-125 MG Oral Tab  Take 1 tablet by mouth 2 (two) times daily. (Patient not taking: Reported on 5/7/2025) 20 tablet 0

## 2025-05-13 ENCOUNTER — TELEPHONE (OUTPATIENT)
Facility: CLINIC | Age: 60
End: 2025-05-13

## 2025-05-13 ENCOUNTER — OFFICE VISIT (OUTPATIENT)
Facility: CLINIC | Age: 60
End: 2025-05-13
Payer: MEDICAID

## 2025-05-13 VITALS
SYSTOLIC BLOOD PRESSURE: 145 MMHG | WEIGHT: 175 LBS | HEART RATE: 65 BPM | HEIGHT: 65 IN | BODY MASS INDEX: 29.16 KG/M2 | DIASTOLIC BLOOD PRESSURE: 69 MMHG

## 2025-05-13 DIAGNOSIS — R15.9 INCONTINENCE OF FECES, UNSPECIFIED FECAL INCONTINENCE TYPE: Primary | ICD-10-CM

## 2025-05-13 DIAGNOSIS — K59.02 DYSSYNERGIC DEFECATION: ICD-10-CM

## 2025-05-13 DIAGNOSIS — K21.00 GASTROESOPHAGEAL REFLUX DISEASE WITH ESOPHAGITIS WITHOUT HEMORRHAGE: ICD-10-CM

## 2025-05-13 DIAGNOSIS — Z87.19 HISTORY OF GALLSTONES: ICD-10-CM

## 2025-05-13 DIAGNOSIS — R19.7 DIARRHEA, UNSPECIFIED TYPE: ICD-10-CM

## 2025-05-13 DIAGNOSIS — K21.00 GASTROESOPHAGEAL REFLUX DISEASE WITH ESOPHAGITIS, UNSPECIFIED WHETHER HEMORRHAGE: Primary | ICD-10-CM

## 2025-05-13 PROCEDURE — 99214 OFFICE O/P EST MOD 30 MIN: CPT | Performed by: NURSE PRACTITIONER

## 2025-05-13 NOTE — H&P
[facility] - Gastroenterology                                                                                                  Clinic History and Physical     Chief Complaint   Patient presents with    Consult     Stomach issues / diarrhea / stomach pain/ Last CLN about 3 year ago        Requesting physician or provider: BRUCE GAGNON MD    HPI:   Ariana Dmuont is a 59 year old year-old female with history of fecal incontinence, pancreatitis, cholelithiasis, esophagitis.      Previously a [facility] patient, transferring care to [place]. Last seen at [facility] 3/21/24.   Has long history of fecal incontinence, diarrhea, GERD.   Did not complete PFT as ordered previously, thought it was only for her bladder/having trouble with transport.   Tried fiber but felt like it caused constipation immediately.  Feels a pressure like something is holding stool in when sitting to have a bowel movement. Thinks it is related to her tailbone being out of place.     Has constant reflux.   Has been told she needs cholecystectomy in the past.  Is hesitant because her daughter had hers out and has a lot of side effects.  Has been on Pantoprazole in the past, was changed to Lansoprazole. Has been taking x years. Does not want to switch medications.  Was recently started on Naproxen for joint pain, reflux has been worse since then.       PRIOR GI WORK UP:   ENDOSCOPY:   10/2023 RECTAL MANOMETRY  Impressions  1. The resting pressure is decreased. The squeeze pressure is decreased.  2. 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  3. The rectal sensation appears unremarkable.  4. RAIR is present (normal).  5. Balloon expulsion test failed.  Consider trial of pelvic floor physical therapy for dyssynergic defecation and fecal incontinence    EGD 9/2023  - LA Grade B esophagitis, slightly worsened from prior. Biopsied.  - Dimunitive area of irregular mucosa at the gastroesophageal junction.  Biopsied.  -  Z-line, 39 cm from the incisors.  - Gastritis.  - Duodenitis.  - The examination was otherwise normal  FINAL DIAGNOSIS   A. GASTRO ESOPHAGEAL JUNCTION BIOPSY:     -SQUAMOCOLUMNAR JUNCTIONAL MUCOSA WITH MILD ACUTE AND CHRONIC INFLAMMATION   -NO INTESTINAL METAPLASIA OR DYSPLASIA IDENTIFIED   -NO SIGNIFICANT EOSINOPHIL INFILTRATES IN ESOPHAGEAL SQUAMOUS MUCOSA     B. DISTAL ESOPHAGUS BIOPSY:     -SQUAMOUS ESOPHAGEAL MUCOSA WITH CHRONIC ESOPHAGITIS AND A FEW INTRAEPITHELIAL   EOSINOPHIL INFILTRATION (UP TO 10/HPF)       C. MID ESOPHAGUS BIOPSY:     -SQUAMOUS ESOPHAGEAL MUCOSA WITH CHRONIC ESOPHAGITIS AND RARE INTRAEPITHELIAL   EOSINOPHIL INFILTRATION (UP TO 1/HPF)      Colonoscopy (5/2023):  - Decreased sphincter tone found on digital rectal exam.  - The examined portion of the ileum was normal.  - Lipoma in the transverse colon and in the cecum.  - One 6 mm polyp in the cecum, removed using injection-lift and a cold  snare. Resected and retrieved.  - Two 5 to 7 mm polyps at the hepatic flexure, removed with a cold snare.  Resected and retrieved.  - One 11 mm polyp in the transverse colon, removed using injection-lift  and a cold snare. Resected and retrieved.  - One 6 mm polyp in the descending colon, removed with a cold snare.  Resected and retrieved.  - Diverticulosis in the sigmoid colon.  - Two 5 to 6 mm polyps in the rectum, removed with a cold snare.  Resected and retrieved.  - Internal hemorrhoids.  - The examination was otherwise normal on direct and retroflexion views.     FINAL DIAGNOSIS   A. GASTRIC ANTRUM INCISURA; BIOPSY:     -GASTRIC ANTRAL GLAND MUCOSA WITHIN NORMAL LIMITS   -NEGATIVE FOR H. PYLORI ON H&E STAINS       B. GASTRIC BODY; BIOPSY:     -GASTRIC OXYNTIC GLAND MUCOSA WITHIN NORMAL LIMITS   -NEGATIVE FOR H. PYLORI ON H&E STAINS       C. GE JUNCTION PAPULE; BIOPSY:     -SQUAMOUS PAPILLOMA   -COLUMNAR MUCOSA WITH NO INTESTINAL METAPLASIA OR DYSPLASIA (CONFIRMED BY ALCIAN    BLUE STAIN)     D.  DISTAL ESOPHAGUS; BIOPSY:     -SQUAMOCOLUMNAR JUNCTIONAL MUCOSA WITH MILD CHRONIC INFLAMMATION AND UP TO 8   INTRAEPITHELIAL EOSINOPHILS/HFP   -NO INTESTINAL METAPLASIA OR DYSPLASIA IDENTIFIED   -PENDING GMS STAIN WILL BE REPORTED IN AN ADDENDUM     E. MID ESOPHAGUS; BIOPSY:     -SQUAMOUS ESOPHAGEAL MUCOSA WITH MILD CHRONIC NONSPECIFIC ESOPHAGITIS AND UP TO   10 INTRAEPITHELIAL EOSINOPHILS/HFP     F. HEPATIC FLEXURE COLON POLYPS X2; BIOPSY:     -HYPERPLASTIC POLYP       G. CECUM POLYP; BIOPSY:     -SESSILE SERRATED LESION   -DEEPER LEVELS EXAMINED       H. TRANSVERSE COLON POLYP; BIOPSY:     -TUBULAR ADENOMA       I. DESCENDING COLON POLYP; BIOPSY:     -FRAGMENTS OF TUBULAR ADENOMAS       J. RECTAL POLYPS; BIOPSY:     -HYPERPLASTIC POLYPS      NSAIDS: started Naproxen recently  Tobacco: smoker x 30 years  Alcohol: none  Marijuana: none  Illicit drugs: none    FH GI malignancy:  none    No history of adverse reaction to sedation  No MARIBEL  No anticoagulants/antiplatelet  No pacemaker/defibrillator  No pain medications and/or sleep aides    Wt Readings from Last 6 Encounters:   05/13/25 175 lb (79.4 kg)   05/07/25 175 lb 9.6 oz (79.7 kg)   04/29/25 169 lb (76.7 kg)   04/16/25 169 lb 6.4 oz (76.8 kg)   04/10/25 170 lb (77.1 kg)   04/04/25 173 lb (78.5 kg)          History, Medications, Allergies, ROS:      Past Medical History[1]   Past Surgical History[2]   Family Hx: Family History[3]   Social History: Short Social Hx on File[4]     Medications (Active prior to today's visit):  Current Medications[5]    Allergies:  Allergies[6]    ROS:   CONSTITUTIONAL: negative for fevers, chills, sweats  EYES Negative for scleral icterus or redness, and diplopia  HEENT: Negative for hoarseness  RESPIRATORY: Negative for cough and severe shortness of breath  CARDIOVASCULAR: Negative for crushing sub-sternal chest pain  GASTROINTESTINAL: See HPI  GENITOURINARY: Negative for dysuria  MUSCULOSKELETAL: Negative for arthralgias and  myalgias  SKIN: Negative for jaundice, rash or pruritus  NEUROLOGICAL: Negative for dizziness and headaches  BEHAVIOR/PSYCH: Negative for psychotic behavior      PHYSICAL EXAM:   Blood pressure 145/69, pulse 65, height 5' 5\" (1.651 m), weight 175 lb (79.4 kg).    GEN: Alert, no acute distress, well-nourished   HEENT: anicteric sclera, neck supple, trachea midline, MMM, no palpable or tender neck or supraclavicular lymph nodes  CV: RRR, the extremities are warm and well perfused   LUNGS: No increased work of breathing, diminished at bases bilaterally   ABDOMEN: Soft, symmetrical, non-tender without distention or guarding.   MSK: No erythema, no warmth, no swelling of joints  SKIN: No jaundice, no erythema, no rashes, no lesions  HEMATOLOGIC: No bleeding, no bruising  NEURO: Alert and interactive, MACHADO  PSYCH: appropriate mood & affect    Labs/Imaging:     Patient's labs and imaging were reviewed and discussed with patient today.    .  ASSESSMENT/PLAN:   Ariana Dumont is a 59 year old year-old female with history of fecal incontinence, pancreatitis, cholelithiasis, esophagitis.        ICD-10-CM    1. Incontinence of feces, unspecified fecal incontinence type  R15.9 Pelvic Floor Therapy - [place] Location      2. Diarrhea, unspecified type  R19.7       3. History of gallstones  Z87.19 US GALLBLADDER (CPT=76705)      4. Gastroesophageal reflux disease with esophagitis without hemorrhage  K21.00 US GALLBLADDER (CPT=76705)      5. Dyssynergic defecation  K59.02 Pelvic Floor Therapy - [place] Location        Diarrhea/fecal incontinence a chronic problem. Has decreased rectal tone. Advised patient that need to complete pelvic floor eval. If unable to make it to [place] will contact insurance and see if there is anywhere closer to home.  GERD likely worse due to NSAID use. Worsening esophagitis on 2023 EGD. Due for repeat EGD, non-urgent.  Discussed that smoking will continue cause more severe symptoms. Has chronic  cough since Covid.   Gallbladder US ordered for further evaluation- patient states she has been told different things with each imaging, none completed in the past few years.     Patient Instructions   -Stop Naproxen- is likely making your reflux worse   -Pelvic floor referral placed again- this is for decreased rectal tone  -See if you can add gallbladder ultrasound to your May apt  -Schedule repeat EGD     ------------------------------  1. Schedule upper endoscopy (EGD) with [facility] [place] [facility]oscopist [Diagnosis: GERD, esophagitis]    Medication Changes:  none     2. If you start any NEW medication after your visit today, please notify us. Certain medications will need to be held before the procedure, or the procedure cannot be performed safely.     3. DO NOT TAKE: Iron (ferrous sulfate/ ferrous gluconate) pills, herbal supplements, multivitamins, or diet medications (i.e. Phentermine/Vyvanse) for 7 days before exam.  DO NOT TAKE: Any form of alcohol, recreational drugs and any forms of Erectile Dysfunction medications 24 hours prior to procedure.    4. The day BEFORE your procedure, NOTHING TO EAT OR DRINK AFTER MIDNIGHT! If your procedure is scheduled in the afternoon, you may have clear liquids only up to 3 hours before the time of your procedure. If you fail to keep your stomach empty for 3 hours prior to procedure time, your procedure may be CANCELLED. Instructions can also be found at: [email or web address]           Endoscopy risk/benefit discussion: I have thoroughly discussed the risks, benefits, and alternatives of endoscopic evaluation with the patient, who demonstrated understanding. This includes the potential risks of bleeding, infection, pain, anesthesia complications, and perforation, which may result in prolonged hospitalization or surgical intervention. All of the patient’s questions were addressed to their satisfaction. The patient has chosen to proceed with the endoscopic procedure, including  any necessary interventions such as polypectomy, biopsy, control of bleeding.      Orders This Visit:  No orders of the defined types were placed in this encounter.      Meds This Visit:  Requested Prescriptions      No prescriptions requested or ordered in this encounter       Imaging & Referrals:  PELVIC FLOOR THERAPY - INTERNAL  US GALLBLADDER (CPT=76705)       SEJAL King    [facility]  2025               [1]   Past Medical History:   Allergic rhinitis    Anxiety    Arthritis    Asthma (HCC)    Constipation    Depression    Esophageal reflux    Essential hypertension    IBS (irritable bowel syndrome)    Osteoarthritis    Seizure disorder (HCC)    Urinary incontinence   [2]   Past Surgical History:  Procedure Laterality Date    Colonoscopy            Other surgical history      reconstructive bladder    Tubal ligation  83   [3]   Family History  Problem Relation Age of Onset    Stroke Mother     Diabetes Mother     Asthma Mother     Other (lung cancer) Father     Cancer Father     Stroke Brother    [4]   Social History  Socioeconomic History    Marital status:    Tobacco Use    Smoking status: Heavy Smoker     Current packs/day: 1.50     Average packs/day: 1.5 packs/day for 45.1 years (67.7 ttl pk-yrs)     Types: Cigarettes     Start date: 1980     Passive exposure: Current    Smokeless tobacco: Never   Vaping Use    Vaping status: Never Used   Substance and Sexual Activity    Alcohol use: Never    Drug use: Never     Types: \"Crack\" cocaine, Cannabis     Comment: Last use      Social Drivers of Health     Food Insecurity: Food Insecurity Present (2025)    NCSS - Food Insecurity     Worried About Running Out of Food in the Last Year: Yes     Ran Out of Food in the Last Year: Yes   Transportation Needs: Unmet Transportation Needs (2025)    NCSS - Transportation     Lack of Transportation: Yes   Housing Stability: Not At Risk (2025)    NCSS  - Housing/Utilities     Has Housing: Yes     Worried About Losing Housing: No     Unable to Get Utilities: No   [5]   Current Outpatient Medications   Medication Sig Dispense Refill    naproxen 500 MG Oral Tab Take 1 tablet (500 mg total) by mouth 2 (two) times daily with meals for 7 days. 14 tablet 0    methocarbamol 750 MG Oral Tab Take 1 tablet (750 mg total) by mouth 3 (three) times daily as needed. 30 tablet 0    lidocaine 5 % External Patch APPLY ONE PATCH TOPICALLY TO THE AFFECTED AREA EVERY MORNING AS DIRECTED      estradiol (ESTRACE) 0.1 MG/GM Vaginal Cream Place 1 g vaginally twice a week. 42.5 g 1    hydroCHLOROthiazide 25 MG Oral Tab Take 1 tablet (25 mg total) by mouth daily. 90 tablet 0    traMADol 50 MG Oral Tab Take 1 tablet (50 mg total) by mouth 2 (two) times daily as needed for Pain. 14 tablet 0    Cholecalciferol (VITAMIN D3) 25 MCG (1000 UT) Oral Cap Take 1 tablet by mouth in the morning.      albuterol 108 (90 Base) MCG/ACT Inhalation Aero Soln Inhale 2 puffs into the lungs every 6 (six) hours as needed for Wheezing or Shortness of Breath.      Multiple Vitamins-Minerals (ONE-A-DAY WOMENS 50 PLUS OR) Take by mouth.      diphenhydrAMINE 25 MG Oral Cap Take 1 capsule (25 mg total) by mouth every 6 (six) hours as needed for Itching.      Lansoprazole 15 MG Oral Capsule Delayed Release Take 1 capsule (15 mg total) by mouth daily. 90 capsule 3    amLODIPine 10 MG Oral Tab Take 1 tablet (10 mg total) by mouth daily. 90 tablet 1    acetaminophen-codeine 300-30 MG Oral Tab Take 1 tablet by mouth.      ondansetron 4 MG Oral Tablet Dispersible       pantoprazole 40 MG Oral Tab EC       amoxicillin clavulanate 875-125 MG Oral Tab Take 1 tablet by mouth 2 (two) times daily. (Patient not taking: Reported on 5/7/2025) 20 tablet 0    B Complex-C-Folic Acid Oral Tab Take by mouth. (Patient not taking: Reported on 5/13/2025)     [6]   Allergies  Allergen Reactions    Iodine (Topical) ANAPHYLAXIS    Nsaids OTHER  (SEE COMMENTS)     Ulcers    Radiology Contrast Iodinated Dyes SWELLING    Sulfa Antibiotics UNKNOWN

## 2025-05-13 NOTE — PATIENT INSTRUCTIONS
-Stop Naproxen- is likely making your reflux worse   -Pelvic floor referral placed again- this is for decreased rectal tone  -See if you can add gallbladder ultrasound to your May apt  -Schedule repeat EGD     ------------------------------  1. Schedule upper endoscopy (EGD) with General [place] Endoscopist [Diagnosis: GERD, esophagitis]    Medication Changes:  none     2. If you start any NEW medication after your visit today, please notify us. Certain medications will need to be held before the procedure, or the procedure cannot be performed safely.     3. DO NOT TAKE: Iron (ferrous sulfate/ ferrous gluconate) pills, herbal supplements, multivitamins, or diet medications (i.e. Phentermine/Vyvanse) for 7 days before exam.  DO NOT TAKE: Any form of alcohol, recreational drugs and any forms of Erectile Dysfunction medications 24 hours prior to procedure.    4. The day BEFORE your procedure, NOTHING TO EAT OR DRINK AFTER MIDNIGHT! If your procedure is scheduled in the afternoon, you may have clear liquids only up to 3 hours before the time of your procedure. If you fail to keep your stomach empty for 3 hours prior to procedure time, your procedure may be CANCELLED. Instructions can also be found at: [email or web address]

## 2025-05-14 ENCOUNTER — OFFICE VISIT (OUTPATIENT)
Dept: PHYSICAL MEDICINE AND REHAB | Facility: CLINIC | Age: 60
End: 2025-05-14
Payer: MEDICAID

## 2025-05-14 ENCOUNTER — HOSPITAL ENCOUNTER (OUTPATIENT)
Dept: GENERAL RADIOLOGY | Facility: HOSPITAL | Age: 60
Discharge: HOME | End: 2025-05-14
Attending: Nurse Practitioner
Payer: MEDICAID

## 2025-05-14 VITALS — OXYGEN SATURATION: 96 % | DIASTOLIC BLOOD PRESSURE: 75 MMHG | SYSTOLIC BLOOD PRESSURE: 153 MMHG | HEART RATE: 83 BPM

## 2025-05-14 DIAGNOSIS — M79.7 FIBROMYALGIA: ICD-10-CM

## 2025-05-14 DIAGNOSIS — R20.2 PARESTHESIAS: ICD-10-CM

## 2025-05-14 DIAGNOSIS — M54.2 CERVICALGIA: Primary | ICD-10-CM

## 2025-05-14 DIAGNOSIS — K21.00 GASTROESOPHAGEAL REFLUX DISEASE WITH ESOPHAGITIS WITHOUT HEMORRHAGE: ICD-10-CM

## 2025-05-14 DIAGNOSIS — M54.12 CERVICAL RADICULOPATHY: ICD-10-CM

## 2025-05-14 DIAGNOSIS — D13.0 SQUAMOUS CELL PAPILLOMA OF ESOPHAGUS: ICD-10-CM

## 2025-05-14 DIAGNOSIS — F41.9 ANXIETY: ICD-10-CM

## 2025-05-14 PROCEDURE — 72040 X-RAY EXAM NECK SPINE 2-3 VW: CPT | Performed by: NURSE PRACTITIONER

## 2025-05-14 PROCEDURE — 99204 OFFICE O/P NEW MOD 45 MIN: CPT | Performed by: PHYSICAL MEDICINE & REHABILITATION

## 2025-05-14 RX ORDER — DULOXETIN HYDROCHLORIDE 20 MG/1
20 CAPSULE, DELAYED RELEASE ORAL DAILY
Qty: 30 CAPSULE | Refills: 0 | Status: SHIPPED | OUTPATIENT
Start: 2025-05-14 | End: 2025-06-13

## 2025-05-14 NOTE — PROGRESS NOTES
[facility]  Progress Note    CHIEF COMPLAINT:    Chief Complaint   Patient presents with    Neck Pain     referred by the spine center  chronic pain/fibromyalgia  new R sided Neck Pain, BUE N/T about 1 month. 5/10 pain that increases with twisting.  Can be 10/10 at its worst . Tramadol last night.        History of Present Illness:  Ariana Dumont is a 59 year old female who is being seen in consultation at the request of the spine center. She recently transferred care from [facility] to [place].  She has a history of fibromyalgia, chronic smoking, GERD, acute pancreatitis, former illegal drug abuse and anxiety.  Her chief complaint today is neck pain that radiates down both arms.  She has had this for years but recently has worsened.  She saw one of the nurse practitioners recently who ordered a plain film x-ray of the cervical spine.  She never obtained that.  Despite having history of fibromyalgia she states she has never been treated for it.  She has never been on duloxetine.  Hands are numb and tingly, sometimes drop things, balance is poor.  Uses a cane to walk because she states her hips hurt.    PAST MEDICAL HISTORY:  Past Medical History[1]    SURGICAL HISTORY:  Past Surgical History[2]    SOCIAL HISTORY:   Social History     Occupational History    Not on file   Tobacco Use    Smoking status: Heavy Smoker     Current packs/day: 1.50     Average packs/day: 1.5 packs/day for 45.1 years (67.7 ttl pk-yrs)     Types: Cigarettes     Start date: 4/4/1980     Passive exposure: Current    Smokeless tobacco: Never   Vaping Use    Vaping status: Never Used   Substance and Sexual Activity    Alcohol use: Never    Drug use: Never     Types: \"Crack\" cocaine, Cannabis     Comment: Last use 2000    Sexual activity: Not on file       CURRENT MEDICATIONS:   Current Medications[3]    ALLERGIES:   Allergies[4]              PHYSICAL EXAM:   /75 (BP Location: Left arm,  Patient Position: Sitting, Cuff Size: adult)   Pulse 83   SpO2 96%     There is no height or weight on file to calculate BMI.      General: No immediate distress  Head: Normocephalic/ Atraumatic  Extremities: No upper extremity edema bilaterally. Peripheral pulses intact.  Spine: Limited cervical flexion  Shoulders: full and painfree ROM, no impingement signs  Neuro:   Cognition: alert & oriented x 3, attentive, able to follow 2 step commands, comprehention intact, spontaneous speech intact  Strength: Upper extremities have 5/5 strength  Sensation: Normal upper extremities  Reflexes: Normal upper extremities, no upper motor neuron signs, negative Shirley, no clonus  Spurling's sign: neg    Data    Radiology Imaging:  None available for this condition    ASSESSMENT AND PLAN:  1. Cervicalgia  Unclear etiology.  I encouraged the patient to obtain the plain film x-ray ordered recently.  She will get it on the way out today.    2. Fibromyalgia  She has not had physical therapy in years.  I think that would be helpful.  I also started her on low-dose Cymbalta 20 mg.  Return in 4 weeks.  Might need a new rheumatologist.  - PHYSICAL THERAPY EXTERNAL    3. Paresthesias  No upper motor neuron signs.  Carpal tunnel syndrome?.  If no better next time will order an EMG.    4. Gastroesophageal reflux disease with esophagitis without hemorrhage  No NSAIDs, limits treatment options    5. Anxiety  Negative comorbidity for painful conditions, Cymbalta may help        RTC: 4 weeks      The patient was in agreement with the assessment and plan.  All questions were answered.         Federico De Los Santos MD  Physical Medicine and Rehabilitation/Sports Medicine  [facility] [place]           [1]   Past Medical History:   Allergic rhinitis    Anxiety    Arthritis    Asthma (HCC)    Constipation    Depression    Esophageal reflux    Essential hypertension    IBS (irritable bowel syndrome)    Osteoarthritis    Seizure disorder  (HCC)    Urinary incontinence   [2]   Past Surgical History:  Procedure Laterality Date    Colonoscopy            Other surgical history      reconstructive bladder    Tubal ligation  83   [3]   Current Outpatient Medications   Medication Sig Dispense Refill    DULoxetine (CYMBALTA) 20 MG Oral Cap DR Particles Take 1 capsule (20 mg total) by mouth daily. 30 capsule 0    naproxen 500 MG Oral Tab Take 1 tablet (500 mg total) by mouth 2 (two) times daily with meals for 7 days. 14 tablet 0    methocarbamol 750 MG Oral Tab Take 1 tablet (750 mg total) by mouth 3 (three) times daily as needed. 30 tablet 0    lidocaine 5 % External Patch APPLY ONE PATCH TOPICALLY TO THE AFFECTED AREA EVERY MORNING AS DIRECTED      estradiol (ESTRACE) 0.1 MG/GM Vaginal Cream Place 1 g vaginally twice a week. 42.5 g 1    hydroCHLOROthiazide 25 MG Oral Tab Take 1 tablet (25 mg total) by mouth daily. 90 tablet 0    Cholecalciferol (VITAMIN D3) 25 MCG (1000 UT) Oral Cap Take 1 tablet by mouth in the morning.      albuterol 108 (90 Base) MCG/ACT Inhalation Aero Soln Inhale 2 puffs into the lungs every 6 (six) hours as needed for Wheezing or Shortness of Breath.      Multiple Vitamins-Minerals (ONE-A-DAY WOMENS 50 PLUS OR) Take by mouth.      B Complex-C-Folic Acid Oral Tab Take by mouth.      diphenhydrAMINE 25 MG Oral Cap Take 1 capsule (25 mg total) by mouth every 6 (six) hours as needed for Itching.      Lansoprazole 15 MG Oral Capsule Delayed Release Take 1 capsule (15 mg total) by mouth daily. 90 capsule 3    amLODIPine 10 MG Oral Tab Take 1 tablet (10 mg total) by mouth daily. 90 tablet 1    acetaminophen-codeine 300-30 MG Oral Tab Take 1 tablet by mouth.      ondansetron 4 MG Oral Tablet Dispersible       pantoprazole 40 MG Oral Tab EC       amoxicillin clavulanate 875-125 MG Oral Tab Take 1 tablet by mouth 2 (two) times daily. (Patient not taking: Reported on 2025) 20 tablet 0   [4]   Allergies  Allergen Reactions    Iodine  (Topical) ANAPHYLAXIS    Nsaids OTHER (SEE COMMENTS)     Ulcers    Radiology Contrast Iodinated Dyes SWELLING    Sulfa Antibiotics UNKNOWN

## 2025-05-16 ENCOUNTER — ORDER TRANSCRIPTION (OUTPATIENT)
Dept: PHYSICAL THERAPY | Facility: HOSPITAL | Age: 60
End: 2025-05-16

## 2025-05-16 DIAGNOSIS — M79.7 FIBROMYALGIA: Primary | ICD-10-CM

## 2025-05-21 ENCOUNTER — HOSPITAL ENCOUNTER (OUTPATIENT)
Dept: CV DIAGNOSTICS | Facility: HOSPITAL | Age: 60
Discharge: HOME | End: 2025-05-21
Payer: MEDICAID

## 2025-05-21 ENCOUNTER — HOSPITAL ENCOUNTER (OUTPATIENT)
Dept: ULTRASOUND IMAGING | Facility: HOSPITAL | Age: 60
Discharge: HOME | End: 2025-05-21
Attending: STUDENT IN AN ORGANIZED HEALTH CARE EDUCATION/TRAINING PROGRAM
Payer: MEDICAID

## 2025-05-21 DIAGNOSIS — R60.0 BILATERAL LEG EDEMA: ICD-10-CM

## 2025-05-21 DIAGNOSIS — R10.2 PELVIC PAIN: ICD-10-CM

## 2025-05-21 PROCEDURE — 93306 TTE W/DOPPLER COMPLETE: CPT

## 2025-05-21 PROCEDURE — 76376 3D RENDER W/INTRP POSTPROCES: CPT

## 2025-05-21 PROCEDURE — 76830 TRANSVAGINAL US NON-OB: CPT | Performed by: STUDENT IN AN ORGANIZED HEALTH CARE EDUCATION/TRAINING PROGRAM

## 2025-05-21 PROCEDURE — 76856 US EXAM PELVIC COMPLETE: CPT | Performed by: STUDENT IN AN ORGANIZED HEALTH CARE EDUCATION/TRAINING PROGRAM

## 2025-05-22 ENCOUNTER — APPOINTMENT (OUTPATIENT)
Dept: PHYSICAL THERAPY | Age: 60
End: 2025-05-22
Attending: NURSE PRACTITIONER
Payer: MEDICAID

## 2025-05-22 ENCOUNTER — OFFICE VISIT (OUTPATIENT)
Dept: OBGYN CLINIC | Facility: CLINIC | Age: 60
End: 2025-05-22
Payer: MEDICAID

## 2025-05-22 VITALS
HEART RATE: 63 BPM | BODY MASS INDEX: 29.16 KG/M2 | DIASTOLIC BLOOD PRESSURE: 66 MMHG | SYSTOLIC BLOOD PRESSURE: 144 MMHG | WEIGHT: 175 LBS | HEIGHT: 65 IN

## 2025-05-22 DIAGNOSIS — N95.0 POST-MENOPAUSE BLEEDING: Primary | ICD-10-CM

## 2025-05-22 PROCEDURE — 99213 OFFICE O/P EST LOW 20 MIN: CPT | Performed by: STUDENT IN AN ORGANIZED HEALTH CARE EDUCATION/TRAINING PROGRAM

## 2025-05-22 RX ORDER — TERCONAZOLE 4 MG/G
1 CREAM VAGINAL NIGHTLY
Qty: 14 EACH | Refills: 0 | Status: SHIPPED | OUTPATIENT
Start: 2025-05-22 | End: 2025-06-05

## 2025-05-22 NOTE — PROGRESS NOTES
[facility]  Obstetrics and Gynecology  Gynecology Established Problem Exam    Chief Complaint   Patient presents with    Follow - Up     Patient presents for f/u, completed pelvic us yesterday                   Ariana Dumont is a 59 year old female presenting for Follow - Up (Patient presents for f/u, completed pelvic us yesterday)   .     Burning due to urinary frequency and skin disruption. Has not started vaginal estrogen. No vaginal bleeding.   Reports needed general anesthesia for previous EGD. Has follow up with heme/onc in July for retroperitoneal lymph node.       Office hysteroscopy in 2nd full week of   Misoprostol the night before for cervical softening  Pap at that visit as well    Medications (Active prior to today's visit):  Current Medications[1]  Allergies:  Allergies[2]  HISTORY:     OB History    Para Term  AB Living   3 2 2  1    SAB IAB Ectopic Multiple Live Births    1         # Outcome Date GA Lbr Douglas/2nd Weight Sex Type Anes PTL Lv   3 IAB            2 Term     M Vag-Spont      1 Term     F Vag-Spont                    Past Medical History[3]    Past Surgical History[4]    Family History[5]    Social History     Socioeconomic History    Marital status:      Spouse name: Not on file    Number of children: Not on file    Years of education: Not on file    Highest education level: Not on file   Occupational History    Not on file   Tobacco Use    Smoking status: Heavy Smoker     Current packs/day: 1.50     Average packs/day: 1.5 packs/day for 45.1 years (67.7 ttl pk-yrs)     Types: Cigarettes     Start date: 1980     Passive exposure: Current    Smokeless tobacco: Never   Vaping Use    Vaping status: Never Used   Substance and Sexual Activity    Alcohol use: Never    Drug use: Never     Types: \"Crack\" cocaine, Cannabis     Comment: Last use     Sexual activity: Not Currently   Other Topics Concern    Not on file   Social History Narrative    Not on file     Social  Drivers of Health     Food Insecurity: Food Insecurity Present (5/7/2025)    NCSS - Food Insecurity     Worried About Running Out of Food in the Last Year: Yes     Ran Out of Food in the Last Year: Yes   Transportation Needs: Unmet Transportation Needs (5/7/2025)    NCSS - Transportation     Lack of Transportation: Yes   Stress: Not on file   Housing Stability: Not At Risk (5/7/2025)    NCSS - Housing/Utilities     Has Housing: Yes     Worried About Losing Housing: No     Unable to Get Utilities: No       ROS:   Review of Systems:    General: no fevers, chills, unintended weight loss/gain except as above  Cardiovascular: no chest pain, new or unexplained SOB except as above  Gastrointestinal: no nausea/vomiting, diarrhea, or blood in stool except as above  Respiratory: no new symptoms reported except as above  Skin: no new symptoms reported except as above  Psychiatric: no new symptoms reported except as above  PHYSICAL EXAM:   /66   Pulse 63   Ht 5' 5\" (1.651 m)   Wt 175 lb (79.4 kg)   BMI 29.12 kg/m²     Physical Exam  HENT:      Head: Normocephalic and atraumatic.   Eyes:      Extraocular Movements: Extraocular movements intact.   Pulmonary:      Effort: Pulmonary effort is normal.   Abdominal:      Palpations: Abdomen is soft.   Musculoskeletal:      Cervical back: Normal range of motion.   Skin:     General: Skin is warm and dry.   Neurological:      General: No focal deficit present.      Mental Status: She is alert. Mental status is at baseline.   Psychiatric:         Behavior: Behavior normal.         Thought Content: Thought content normal.                  RESULTS & IMAGING     Component  Ref Range & Units (hover)    Bacterial Vaginosis Negative   Candida group Negative   Comment: Candida group includes C. ablicans, C. tropicalis, C. parapsilosis, and  C. dubliniensis.   Nakaseomyces glabrata (Candida glabrata) Positive Abnormal    Comment: This organism has variable resistance to  fluconazole.  Antimicrobial susceptibility testing will not be performed. Topical antifungal therapy is usually preferred.   Pichia yolandazevii (Candida krusei) Negative   Trichomonas vaginalis Negative       5/21/2025 Pelvic Ultrasound  FINDINGS:  UTERUS:   Uterus measures 6.0 x 3.0 x 3.8 cm     ENDOMETRIUM: Trace fluid in the endometrial canal consistent with blood/seroma measuring 2.2 mm thickness.  Individual endometrial thickness measuring 3.1 mm and 2.6 mm along the anterior and posterior margins for combined endometrial thickness of 5.7  mm, borderline thickened.  Differential would include medication induced endometrial changes, atypical hyperplasia versus early endometrial neoplasm.  Small endometrial polypoid lesion measuring 3 x 2 mm.  Nabothian cyst noted within the cervix.  MYOMETRIUM: Normal echogenicity.  No masses.       OVARIES AND ADNEXA:  RIGHT:   Measures 2.4 x 1.4 x 2.2 cm.  Complex hemorrhagic cyst measures 0.9 x 0.8 cm.  LEFT:   Measures 2.3 x 1.4 x 1.6 cm.  Normal appearance.       CUL-DE-SAC:   Normal.  No free fluid or mass.    OTHER: Negative.  Bladder appears normal.                 Impression   CONCLUSION:  1. Borderline thickened endometrium with combined endometrial thickness measuring 5.7 mm.  Small amount of blood/seroma in the endometrial canal measuring 2.2 mm.  Differential includes medication induced endometrial changes, atypical hyperplasia versus  early endometrial neoplasm.  Recommend histologic correlation.  2.   Small endometrial polypoid lesion measuring 3 x 2 mm.  Benign Nabothian cysts within the cervix.  3. Normal appearing right ovary.  Small complex hemorrhagic left ovarian cyst measuring 9 x 8 mm.           No results for input(s): \"URINEPREG\" in the last 72 hours.    No results for input(s): \"PGLU\", \"POCTGLUCOSE\" in the last 72 hours.    No results for input(s): \"GLUCOSEDIP\", \"BILIRUBIN\", \"KETONESDIP\", \"BLOODU\", \"PHURINE\", \"UROBILIN\", \"NITRITE\", \"LEUKOCYTES\",  \"APPEARANCE\", \"URINECOLOR\" in the last 72 hours.    Invalid input(s): \"SPECGRAV\"       ASSESSMENT & PLAN     There are no diagnoses linked to this encounter.    Plan for office HSC with sampling - message sent  Repeat US in 8-12 weeks to reassess the left ovary given postmenopausal         Caryn Hills MD  5/22/2025  12:02 PM               [1]   Current Outpatient Medications   Medication Sig Dispense Refill    DULoxetine (CYMBALTA) 20 MG Oral Cap DR Particles Take 1 capsule (20 mg total) by mouth daily. 30 capsule 0    methocarbamol 750 MG Oral Tab Take 1 tablet (750 mg total) by mouth 3 (three) times daily as needed. 30 tablet 0    lidocaine 5 % External Patch APPLY ONE PATCH TOPICALLY TO THE AFFECTED AREA EVERY MORNING AS DIRECTED      estradiol (ESTRACE) 0.1 MG/GM Vaginal Cream Place 1 g vaginally twice a week. 42.5 g 1    hydroCHLOROthiazide 25 MG Oral Tab Take 1 tablet (25 mg total) by mouth daily. 90 tablet 0    Cholecalciferol (VITAMIN D3) 25 MCG (1000 UT) Oral Cap Take 1 tablet by mouth in the morning.      albuterol 108 (90 Base) MCG/ACT Inhalation Aero Soln Inhale 2 puffs into the lungs every 6 (six) hours as needed for Wheezing or Shortness of Breath.      Multiple Vitamins-Minerals (ONE-A-DAY WOMENS 50 PLUS OR) Take by mouth.      B Complex-C-Folic Acid Oral Tab Take by mouth.      diphenhydrAMINE 25 MG Oral Cap Take 1 capsule (25 mg total) by mouth every 6 (six) hours as needed for Itching.      Lansoprazole 15 MG Oral Capsule Delayed Release Take 1 capsule (15 mg total) by mouth daily. 90 capsule 3    amLODIPine 10 MG Oral Tab Take 1 tablet (10 mg total) by mouth daily. 90 tablet 1    acetaminophen-codeine 300-30 MG Oral Tab Take 1 tablet by mouth.      ondansetron 4 MG Oral Tablet Dispersible       pantoprazole 40 MG Oral Tab EC       amoxicillin clavulanate 875-125 MG Oral Tab Take 1 tablet by mouth 2 (two) times daily. (Patient not taking: Reported on 5/7/2025) 20 tablet 0   [2]    Allergies  Allergen Reactions    Iodine (Topical) ANAPHYLAXIS    Nsaids OTHER (SEE COMMENTS)     Ulcers    Radiology Contrast Iodinated Dyes SWELLING    Sulfa Antibiotics UNKNOWN   [3]   Past Medical History:   Allergic rhinitis    Anxiety    Arthritis    Asthma (HCC)    Constipation    Depression    Esophageal reflux    Essential hypertension    Fibromyalgia    IBS (irritable bowel syndrome)    Osteoarthritis    Seizure disorder (HCC)    Urinary incontinence   [4]   Past Surgical History:  Procedure Laterality Date    Colonoscopy            Other surgical history      reconstructive bladder    Tubal ligation  81   [5]   Family History  Problem Relation Age of Onset    Stroke Mother     Diabetes Mother     Asthma Mother     Other (lung cancer) Father     Cancer Father     Stroke Brother

## 2025-05-27 ENCOUNTER — OFFICE VISIT (OUTPATIENT)
Dept: PHYSICAL THERAPY | Age: 60
End: 2025-05-27
Attending: NURSE PRACTITIONER
Payer: MEDICAID

## 2025-05-27 DIAGNOSIS — R15.9 INCONTINENCE OF FECES, UNSPECIFIED FECAL INCONTINENCE TYPE: Primary | ICD-10-CM

## 2025-05-27 DIAGNOSIS — K59.02 DYSSYNERGIC DEFECATION: ICD-10-CM

## 2025-05-27 PROCEDURE — 97163 PT EVAL HIGH COMPLEX 45 MIN: CPT

## 2025-05-27 PROCEDURE — 97530 THERAPEUTIC ACTIVITIES: CPT

## 2025-05-27 PROCEDURE — 97112 NEUROMUSCULAR REEDUCATION: CPT
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
## 2025-06-05 ENCOUNTER — APPOINTMENT (OUTPATIENT)
Dept: PHYSICAL THERAPY | Age: 60
End: 2025-06-05
Attending: NURSE PRACTITIONER
Payer: MEDICAID

## 2025-06-10 ENCOUNTER — APPOINTMENT (OUTPATIENT)
Dept: PHYSICAL THERAPY | Age: 60
End: 2025-06-10
Attending: NURSE PRACTITIONER
Payer: MEDICAID

## 2025-06-11 ENCOUNTER — OFFICE VISIT (OUTPATIENT)
Dept: PHYSICAL MEDICINE AND REHAB | Facility: CLINIC | Age: 60
End: 2025-06-11
Payer: MEDICAID

## 2025-06-11 DIAGNOSIS — K21.00 GASTROESOPHAGEAL REFLUX DISEASE WITH ESOPHAGITIS WITHOUT HEMORRHAGE: ICD-10-CM

## 2025-06-11 DIAGNOSIS — D13.0 SQUAMOUS CELL PAPILLOMA OF ESOPHAGUS: ICD-10-CM

## 2025-06-11 DIAGNOSIS — M79.7 FIBROMYALGIA: Primary | ICD-10-CM

## 2025-06-11 DIAGNOSIS — F41.9 ANXIETY: ICD-10-CM

## 2025-06-11 DIAGNOSIS — M54.2 CERVICALGIA: ICD-10-CM

## 2025-06-11 DIAGNOSIS — R20.2 PARESTHESIAS: ICD-10-CM

## 2025-06-11 PROCEDURE — 99214 OFFICE O/P EST MOD 30 MIN: CPT | Performed by: PHYSICAL MEDICINE & REHABILITATION

## 2025-06-11 RX ORDER — DULOXETIN HYDROCHLORIDE 20 MG/1
20 CAPSULE, DELAYED RELEASE ORAL DAILY
Qty: 30 CAPSULE | Refills: 0 | Status: SHIPPED | OUTPATIENT
Start: 2025-06-11 | End: 2025-07-11

## 2025-06-11 NOTE — PROGRESS NOTES
[facility]  Progress Note    CHIEF COMPLAINT:    Chief Complaint   Patient presents with    Follow - Up     LOV 5/14/24 patient here for one month follow up. 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 Also hasn't started cymbalta, didn't pick it up. LOP 7/10       History of Present Illness:  Ariana Dumont is a 59 year old female who presents today for follow up for symptoms of fibromyalgia and widespread somatic complaints.  I last saw her about a month ago at which time I recommended physical therapy, Cymbalta 20 mg.  She has an extensive differential diagnosis.    PAST MEDICAL HISTORY:  Past Medical History[1]    SURGICAL HISTORY:  Past Surgical History[2]    SOCIAL HISTORY:   Social History     Occupational History    Not on file   Tobacco Use    Smoking status: Heavy Smoker     Current packs/day: 1.50     Average packs/day: 1.5 packs/day for 45.2 years (67.8 ttl pk-yrs)     Types: Cigarettes     Start date: 4/4/1980     Passive exposure: Current    Smokeless tobacco: Never   Vaping Use    Vaping status: Never Used   Substance and Sexual Activity    Alcohol use: Never    Drug use: Never     Types: \"Crack\" cocaine, Cannabis     Comment: Last use 2000    Sexual activity: Not Currently       CURRENT MEDICATIONS:   Current Medications[3]    ALLERGIES:   Allergies[4]      PHYSICAL EXAM:   There were no vitals taken for this visit.    There is no height or weight on file to calculate BMI.      General: No immediate distress  Extremities: No upper extremity edema bilaterally   Spine: full and painfree cervical ROM in all directions  Shoulders: full and painfree ROM   Neuro:   Cognition: alert & oriented x 3, attentive, comprehention intact, spontaneous speech intact  Strength:  Upper extremities have 5/5 strength  Sensation: Normal upper extremities  Reflexes: Normal upper extremities  Spurling's sign:  neg        Data    Radiology Imaging:  I personally reviewed a plain film x-ray of the cervical spine from May 14, 2025 showing multilevel disc degeneration, decreased lordosis, facet arthropathy.    ASSESSMENT AND PLAN:  1. Fibromyalgia  Unfortunately she has not had adequate PT and has not started Cymbalta.  It will be hard to evaluate her if she has not had this initial adequate management.  She will take Cymbalta for 1 month, continue PT and return, consider increasing.    2. Cervicalgia  As nonspecific degenerative change    3. Paresthesias  Consider EMG for potential carpal tunnel syndrome if no better    4. Squamous cell papilloma of esophagus  No NSAIDs, limits treatment options    5. Gastroesophageal reflux disease with esophagitis without hemorrhage  No NSAIDs    6. Anxiety  Negative for major painful conditions.  Cymbalta may help        RTC 4 weeks        The patient was in agreement with the assessment and plan.  All questions were answered.        Federico De Los Santos MD  Physical Medicine and Rehabilitation/Sports Medicine  [facility]             [1]   Past Medical History:   Allergic rhinitis    Anxiety    Arthritis    Asthma (HCC)    Constipation    Depression    Esophageal reflux    Essential hypertension    Fibromyalgia    IBS (irritable bowel syndrome)    Osteoarthritis    Seizure disorder (HCC)    Urinary incontinence   [2]   Past Surgical History:  Procedure Laterality Date    Colonoscopy            Other surgical history      reconstructive bladder    Tubal ligation  81   [3]   Current Outpatient Medications   Medication Sig Dispense Refill    DULoxetine (CYMBALTA) 20 MG Oral Cap DR Particles Take 1 capsule (20 mg total) by mouth daily. 30 capsule 0    methocarbamol 750 MG Oral Tab Take 1 tablet (750 mg total) by mouth 3 (three) times daily as needed. 30 tablet 0    acetaminophen-codeine 300-30 MG Oral Tab Take 1 tablet by mouth.      lidocaine 5 % External  Patch APPLY ONE PATCH TOPICALLY TO THE AFFECTED AREA EVERY MORNING AS DIRECTED      estradiol (ESTRACE) 0.1 MG/GM Vaginal Cream Place 1 g vaginally twice a week. 42.5 g 1    amoxicillin clavulanate 875-125 MG Oral Tab Take 1 tablet by mouth 2 (two) times daily. (Patient not taking: Reported on 5/7/2025) 20 tablet 0    hydroCHLOROthiazide 25 MG Oral Tab Take 1 tablet (25 mg total) by mouth daily. 90 tablet 0    Cholecalciferol (VITAMIN D3) 25 MCG (1000 UT) Oral Cap Take 1 tablet by mouth in the morning.      albuterol 108 (90 Base) MCG/ACT Inhalation Aero Soln Inhale 2 puffs into the lungs every 6 (six) hours as needed for Wheezing or Shortness of Breath.      Multiple Vitamins-Minerals (ONE-A-DAY WOMENS 50 PLUS OR) Take by mouth.      B Complex-C-Folic Acid Oral Tab Take by mouth.      diphenhydrAMINE 25 MG Oral Cap Take 1 capsule (25 mg total) by mouth every 6 (six) hours as needed for Itching.      Lansoprazole 15 MG Oral Capsule Delayed Release Take 1 capsule (15 mg total) by mouth daily. 90 capsule 3    amLODIPine 10 MG Oral Tab Take 1 tablet (10 mg total) by mouth daily. 90 tablet 1   [4]   Allergies  Allergen Reactions    Iodine (Topical) ANAPHYLAXIS    Nsaids OTHER (SEE COMMENTS)     Ulcers    Radiology Contrast Iodinated Dyes SWELLING    Sulfa Antibiotics UNKNOWN

## 2025-06-12 ENCOUNTER — TELEPHONE (OUTPATIENT)
Facility: CLINIC | Age: 60
End: 2025-06-12

## 2025-06-12 NOTE — TELEPHONE ENCOUNTER
1st Reminder letter was sent to patient mychart:     [facility] GALL[facility]DER (CPT=[id number]) (Order #[id number]) on 5/13/25

## 2025-06-13 ENCOUNTER — TELEPHONE (OUTPATIENT)
Dept: OBGYN CLINIC | Facility: CLINIC | Age: 60
End: 2025-06-13

## 2025-06-13 DIAGNOSIS — R10.2 PELVIC PAIN: Primary | ICD-10-CM

## 2025-06-13 NOTE — TELEPHONE ENCOUNTER
Called patient regarding appointment scheduled 06/16/25 , patient informed that  is in the hospital for critical medical issues and would like to cancel appointment, informed  patient appointment will be canceled,

## 2025-07-16 ENCOUNTER — APPOINTMENT (OUTPATIENT)
Dept: PHYSICAL THERAPY | Age: 60
End: 2025-07-16
Attending: PHYSICAL MEDICINE & REHABILITATION

## 2025-07-23 ENCOUNTER — APPOINTMENT (OUTPATIENT)
Dept: PHYSICAL THERAPY | Age: 60
End: 2025-07-23

## 2025-07-30 ENCOUNTER — TELEPHONE (OUTPATIENT)
Facility: CLINIC | Age: 60
End: 2025-07-30

## 2025-07-30 ENCOUNTER — APPOINTMENT (OUTPATIENT)
Dept: PHYSICAL THERAPY | Age: 60
End: 2025-07-30

## 2025-08-06 ENCOUNTER — APPOINTMENT (OUTPATIENT)
Dept: PHYSICAL THERAPY | Age: 60
End: 2025-08-06

## 2025-08-13 ENCOUNTER — APPOINTMENT (OUTPATIENT)
Dept: PHYSICAL THERAPY | Age: 60
End: 2025-08-13

## 2025-08-20 ENCOUNTER — APPOINTMENT (OUTPATIENT)
Dept: PHYSICAL THERAPY | Age: 60
End: 2025-08-20

## 2025-08-27 ENCOUNTER — APPOINTMENT (OUTPATIENT)
Dept: PHYSICAL THERAPY | Age: 60
End: 2025-08-27

## (undated) NOTE — LETTER
6/12/2025          Ariana Dumont    [address] [address]         Dear Ariana,    Our records indicate that the tests ordered for you by SEJAL King  have not been done.  If you have, in fact, already completed the tests or you do not wish to have the tests done, please contact our office at THE NUMBER LISTED BELOW.  Otherwise, please proceed with the testing.  Enclosed is a duplicate order for your convenience.      [facility] (CPT=[id number]) (Order #[id number]) on 5/13/25   To schedule a test, call Central Scheduling at [phone]    Sincerely,    SEJAL King  [facility]  [address]  [place] [address] [phone]

## (undated) NOTE — ED AVS SNAPSHOT
Newton Hatchet   MRN: [id number]    Department:  [facility] Emergency Department   Date of Visit:  8/12/2019           Disclosure     Insurance plans vary and the physician(s) referred by the ER may not be covered by your plan.  Please contact y CARE PHYSICIAN AT ONCE OR RETURN IMMEDIATELY TO THE EMERGENCY DEPARTMENT. If you have been prescribed any medication(s), please fill your prescription right away and begin taking the medication(s) as directed.   If you believe that any of the medications

## (undated) NOTE — LETTER
AUTHORIZATION FOR SURGICAL OPERATION OR OTHER PROCEDURE    1. I hereby authorize Dr. Hills and [facility] staff assigned to my case to perform the following operation and/or procedure at the [facility] site:    Pelvic exam, Pap smear, hysteroscopy with endometrial sampling and possible polypectomy planned for June  _______________________________________________________________________________________________      _______________________________________________________________________________________________    2.  My physician has explained the nature and purpose of the operation or other procedure, possible alternative methods of treatment, the risks involved, and the possibility of complication to me.  I acknowledge that no guarantee has been made as to the result that may be obtained.  3.  I recognize that, during the course of this operation, or other procedure, unforseen conditions may necessitate additional or different procedure than those listed above.  I, therefore, further authorize and request that the above named physician, his/her physician assistants or designees perform such procedures as are, in his/her professional opinion, necessary and desirable.  4.  Any tissue or organs removed in the operation or other procedure may be disposed of by and at the discretion of the [facility] and [facility].  5.  I understand that in the event of a medical emergency, I will be transported by local paramedics to [facility] or other hospital emergency department.  6.  I certify that I have read and fully understand the above consent to operation and/or other procedure.    7.  I acknowledge that my physician has explained sedation/analgesia administration to me including the risks and benefits.  I consent to the administration of sedation/analgesia as may be necessary or desirable in the judgement of my physician.    Witness signature:  ___________________________________________________ Date:  ______/______/_____                    Time:  ________ A.M.  P.M.       Patient Name:  ______________________________________________________  (please print)      Patient signature:  ___________________________________________________             Relationship to Patient:           []  Parent    Responsible person                          []  Spouse  In case of minor or                    [] Other  _____________   Incompetent name:  __________________________________________________                               (please print)      _____________      Responsible person  In case of minor or  Incompetent signature:  _______________________________________________    Statement of Physician  My signature below affirms that prior to the time of the procedure, I have explained to the patient and/or his/her guardian, the risks and benefits involved in the proposed treatment and any reasonable alternative to the proposed treatment.  I have also explained the risks and benefits involved in the refusal of the proposed treatment and have answered the patient's questions.                        Date:  ______/______/_______  Provider                      Signature:  __________________________________________________________       Time:  ___________ LLUVIA PITTMAN

## (undated) NOTE — LETTER
WHERE IS YOUR PAIN NOW?  Red the areas on your body where you feel the described sensations.  Use the appropriate symbol.  Red the areas of radiation.  Include all affected areas.  Just to complete the picture, please draw in the face.     ACHE:  ^ ^ ^   NUMBNESS:  0000   PINS & NEEDLES:  = = = =                              ^ ^ ^                       0000              = = = =                                    ^ ^ ^                       0000            = = = =      BURNING:  XXXX   STABBING: ////                  XXXX                ////                         XXXX          ////     Please red the line below indicating your degree of pain right now  with 0 being no pain 10 being the worst pain possible.                                         0             1             2              3             4              5              6              7             8             9             10         Patient Signature:

## (undated) NOTE — LETTER
4/2/2025          To Whom It May Concern:    Ariana Dumont is currently under my medical care and uses and need Urinary Incontinence pads due to prior surgery and ongoing chronic condition and requires financial reimbursement/supplementation to purchase urinary pads.    If you require additional information please contact our office.        Sincerely,    Mateo Howard MD          Document generated by:  Mateo Howard MD